# Patient Record
Sex: MALE | Race: WHITE | NOT HISPANIC OR LATINO | URBAN - METROPOLITAN AREA
[De-identification: names, ages, dates, MRNs, and addresses within clinical notes are randomized per-mention and may not be internally consistent; named-entity substitution may affect disease eponyms.]

---

## 2017-02-08 ENCOUNTER — OUTPATIENT (OUTPATIENT)
Dept: OUTPATIENT SERVICES | Facility: HOSPITAL | Age: 77
LOS: 1 days | Discharge: HOME | End: 2017-02-08

## 2017-06-27 DIAGNOSIS — E87.5 HYPERKALEMIA: ICD-10-CM

## 2019-04-02 ENCOUNTER — INPATIENT (INPATIENT)
Facility: HOSPITAL | Age: 79
LOS: 6 days | Discharge: NURSING, REHAB OR LONG-TERM CARE | End: 2019-04-09
Attending: INTERNAL MEDICINE | Admitting: INTERNAL MEDICINE
Payer: COMMERCIAL

## 2019-04-02 VITALS
RESPIRATION RATE: 18 BRPM | SYSTOLIC BLOOD PRESSURE: 78 MMHG | DIASTOLIC BLOOD PRESSURE: 54 MMHG | OXYGEN SATURATION: 96 % | HEIGHT: 69 IN | WEIGHT: 175.05 LBS | HEART RATE: 55 BPM

## 2019-04-02 DIAGNOSIS — I50.9 HEART FAILURE, UNSPECIFIED: ICD-10-CM

## 2019-04-02 DIAGNOSIS — I95.9 HYPOTENSION, UNSPECIFIED: ICD-10-CM

## 2019-04-02 DIAGNOSIS — Z98.890 OTHER SPECIFIED POSTPROCEDURAL STATES: Chronic | ICD-10-CM

## 2019-04-02 DIAGNOSIS — R63.4 ABNORMAL WEIGHT LOSS: ICD-10-CM

## 2019-04-02 DIAGNOSIS — E11.9 TYPE 2 DIABETES MELLITUS WITHOUT COMPLICATIONS: ICD-10-CM

## 2019-04-02 DIAGNOSIS — R47.81 SLURRED SPEECH: ICD-10-CM

## 2019-04-02 DIAGNOSIS — E03.9 HYPOTHYROIDISM, UNSPECIFIED: ICD-10-CM

## 2019-04-02 DIAGNOSIS — N17.9 ACUTE KIDNEY FAILURE, UNSPECIFIED: ICD-10-CM

## 2019-04-02 LAB
ALBUMIN SERPL ELPH-MCNC: 2.9 G/DL — LOW (ref 3.5–5.2)
ALP SERPL-CCNC: 164 U/L — HIGH (ref 30–115)
ALT FLD-CCNC: 21 U/L — SIGNIFICANT CHANGE UP (ref 0–41)
ANION GAP SERPL CALC-SCNC: 14 MMOL/L — SIGNIFICANT CHANGE UP (ref 7–14)
ANION GAP SERPL CALC-SCNC: 15 MMOL/L — HIGH (ref 7–14)
ANION GAP SERPL CALC-SCNC: 19 MMOL/L — HIGH (ref 7–14)
APPEARANCE UR: CLEAR — SIGNIFICANT CHANGE UP
APTT BLD: 25.4 SEC — LOW (ref 27–39.2)
AST SERPL-CCNC: 28 U/L — SIGNIFICANT CHANGE UP (ref 0–41)
BASE EXCESS BLDV CALC-SCNC: -12.2 MMOL/L — LOW (ref -2–2)
BASOPHILS # BLD AUTO: 0.04 K/UL — SIGNIFICANT CHANGE UP (ref 0–0.2)
BASOPHILS NFR BLD AUTO: 0.3 % — SIGNIFICANT CHANGE UP (ref 0–1)
BILIRUB SERPL-MCNC: 0.4 MG/DL — SIGNIFICANT CHANGE UP (ref 0.2–1.2)
BILIRUB UR-MCNC: NEGATIVE — SIGNIFICANT CHANGE UP
BUN SERPL-MCNC: 134 MG/DL — CRITICAL HIGH (ref 10–20)
BUN SERPL-MCNC: 137 MG/DL — CRITICAL HIGH (ref 10–20)
BUN SERPL-MCNC: 153 MG/DL — CRITICAL HIGH (ref 10–20)
CA-I SERPL-SCNC: 1.29 MMOL/L — SIGNIFICANT CHANGE UP (ref 1.12–1.3)
CALCIUM SERPL-MCNC: 7.7 MG/DL — LOW (ref 8.5–10.1)
CALCIUM SERPL-MCNC: 7.8 MG/DL — LOW (ref 8.5–10.1)
CALCIUM SERPL-MCNC: 9.1 MG/DL — SIGNIFICANT CHANGE UP (ref 8.5–10.1)
CHLORIDE SERPL-SCNC: 116 MMOL/L — HIGH (ref 98–110)
CHLORIDE SERPL-SCNC: 120 MMOL/L — HIGH (ref 98–110)
CHLORIDE SERPL-SCNC: 121 MMOL/L — HIGH (ref 98–110)
CO2 SERPL-SCNC: 13 MMOL/L — LOW (ref 17–32)
CO2 SERPL-SCNC: 13 MMOL/L — LOW (ref 17–32)
CO2 SERPL-SCNC: 18 MMOL/L — SIGNIFICANT CHANGE UP (ref 17–32)
COLOR SPEC: YELLOW — SIGNIFICANT CHANGE UP
CREAT SERPL-MCNC: 4.2 MG/DL — CRITICAL HIGH (ref 0.7–1.5)
CREAT SERPL-MCNC: 4.5 MG/DL — CRITICAL HIGH (ref 0.7–1.5)
CREAT SERPL-MCNC: 5.1 MG/DL — CRITICAL HIGH (ref 0.7–1.5)
DIFF PNL FLD: ABNORMAL
EOSINOPHIL # BLD AUTO: 0.12 K/UL — SIGNIFICANT CHANGE UP (ref 0–0.7)
EOSINOPHIL NFR BLD AUTO: 0.9 % — SIGNIFICANT CHANGE UP (ref 0–8)
GAS PNL BLDV: 148 MMOL/L — HIGH (ref 136–145)
GAS PNL BLDV: SIGNIFICANT CHANGE UP
GLUCOSE BLDC GLUCOMTR-MCNC: 132 MG/DL — HIGH (ref 70–99)
GLUCOSE BLDC GLUCOMTR-MCNC: 177 MG/DL — HIGH (ref 70–99)
GLUCOSE SERPL-MCNC: 133 MG/DL — HIGH (ref 70–99)
GLUCOSE SERPL-MCNC: 139 MG/DL — HIGH (ref 70–99)
GLUCOSE SERPL-MCNC: 159 MG/DL — HIGH (ref 70–99)
GLUCOSE UR QL: NEGATIVE MG/DL — SIGNIFICANT CHANGE UP
HCO3 BLDV-SCNC: 13 MMOL/L — LOW (ref 22–29)
HCT VFR BLD CALC: 45.3 % — SIGNIFICANT CHANGE UP (ref 42–52)
HCT VFR BLDA CALC: 51.2 % — HIGH (ref 34–44)
HGB BLD CALC-MCNC: 16.7 G/DL — SIGNIFICANT CHANGE UP (ref 14–18)
HGB BLD-MCNC: 14.1 G/DL — SIGNIFICANT CHANGE UP (ref 14–18)
HOROWITZ INDEX BLDV+IHG-RTO: 21 — SIGNIFICANT CHANGE UP
IMM GRANULOCYTES NFR BLD AUTO: 0.5 % — HIGH (ref 0.1–0.3)
INR BLD: 1.26 RATIO — SIGNIFICANT CHANGE UP (ref 0.65–1.3)
KETONES UR-MCNC: NEGATIVE — SIGNIFICANT CHANGE UP
LACTATE BLDV-MCNC: 2.9 MMOL/L — HIGH (ref 0.5–1.6)
LEUKOCYTE ESTERASE UR-ACNC: ABNORMAL
LYMPHOCYTES # BLD AUTO: 0.51 K/UL — LOW (ref 1.2–3.4)
LYMPHOCYTES # BLD AUTO: 3.8 % — LOW (ref 20.5–51.1)
MCHC RBC-ENTMCNC: 29.7 PG — SIGNIFICANT CHANGE UP (ref 27–31)
MCHC RBC-ENTMCNC: 31.1 G/DL — LOW (ref 32–37)
MCV RBC AUTO: 95.6 FL — HIGH (ref 80–94)
MONOCYTES # BLD AUTO: 1.24 K/UL — HIGH (ref 0.1–0.6)
MONOCYTES NFR BLD AUTO: 9.4 % — HIGH (ref 1.7–9.3)
NEUTROPHILS # BLD AUTO: 11.28 K/UL — HIGH (ref 1.4–6.5)
NEUTROPHILS NFR BLD AUTO: 85.1 % — HIGH (ref 42.2–75.2)
NITRITE UR-MCNC: NEGATIVE — SIGNIFICANT CHANGE UP
NRBC # BLD: 0 /100 WBCS — SIGNIFICANT CHANGE UP (ref 0–0)
NT-PROBNP SERPL-SCNC: 5261 PG/ML — HIGH (ref 0–300)
PCO2 BLDV: 28 MMHG — LOW (ref 41–51)
PH BLDV: 7.27 — SIGNIFICANT CHANGE UP (ref 7.26–7.43)
PH UR: 5.5 — SIGNIFICANT CHANGE UP (ref 5–8)
PLATELET # BLD AUTO: 114 K/UL — LOW (ref 130–400)
PO2 BLDV: 40 MMHG — SIGNIFICANT CHANGE UP (ref 20–40)
POTASSIUM BLDV-SCNC: 5.5 MMOL/L — SIGNIFICANT CHANGE UP (ref 3.3–5.6)
POTASSIUM SERPL-MCNC: 4.7 MMOL/L — SIGNIFICANT CHANGE UP (ref 3.5–5)
POTASSIUM SERPL-MCNC: 5 MMOL/L — SIGNIFICANT CHANGE UP (ref 3.5–5)
POTASSIUM SERPL-MCNC: 6.9 MMOL/L — CRITICAL HIGH (ref 3.5–5)
POTASSIUM SERPL-SCNC: 4.7 MMOL/L — SIGNIFICANT CHANGE UP (ref 3.5–5)
POTASSIUM SERPL-SCNC: 5 MMOL/L — SIGNIFICANT CHANGE UP (ref 3.5–5)
POTASSIUM SERPL-SCNC: 6.9 MMOL/L — CRITICAL HIGH (ref 3.5–5)
PROT SERPL-MCNC: 6.2 G/DL — SIGNIFICANT CHANGE UP (ref 6–8)
PROT UR-MCNC: ABNORMAL MG/DL
PROTHROM AB SERPL-ACNC: 14.5 SEC — HIGH (ref 9.95–12.87)
RBC # BLD: 4.74 M/UL — SIGNIFICANT CHANGE UP (ref 4.7–6.1)
RBC # FLD: 16.4 % — HIGH (ref 11.5–14.5)
RBC CASTS # UR COMP ASSIST: ABNORMAL /HPF
SAO2 % BLDV: 68 % — SIGNIFICANT CHANGE UP
SODIUM SERPL-SCNC: 148 MMOL/L — HIGH (ref 135–146)
SODIUM SERPL-SCNC: 149 MMOL/L — HIGH (ref 135–146)
SODIUM SERPL-SCNC: 152 MMOL/L — HIGH (ref 135–146)
SP GR SPEC: 1.01 — SIGNIFICANT CHANGE UP (ref 1.01–1.03)
TROPONIN T SERPL-MCNC: 0.03 NG/ML — CRITICAL HIGH
TROPONIN T SERPL-MCNC: 0.05 NG/ML — CRITICAL HIGH
UROBILINOGEN FLD QL: 0.2 MG/DL — SIGNIFICANT CHANGE UP (ref 0.2–0.2)
WBC # BLD: 13.26 K/UL — HIGH (ref 4.8–10.8)
WBC # FLD AUTO: 13.26 K/UL — HIGH (ref 4.8–10.8)
WBC UR QL: ABNORMAL /HPF

## 2019-04-02 PROCEDURE — 71045 X-RAY EXAM CHEST 1 VIEW: CPT | Mod: 26,76

## 2019-04-02 PROCEDURE — 76937 US GUIDE VASCULAR ACCESS: CPT | Mod: 26

## 2019-04-02 PROCEDURE — 71275 CT ANGIOGRAPHY CHEST: CPT | Mod: 26

## 2019-04-02 PROCEDURE — 99291 CRITICAL CARE FIRST HOUR: CPT | Mod: 25

## 2019-04-02 PROCEDURE — 36556 INSERT NON-TUNNEL CV CATH: CPT

## 2019-04-02 RX ORDER — DEXTROSE 50 % IN WATER 50 %
100 SYRINGE (ML) INTRAVENOUS ONCE
Qty: 0 | Refills: 0 | Status: COMPLETED | OUTPATIENT
Start: 2019-04-02 | End: 2019-04-02

## 2019-04-02 RX ORDER — INSULIN HUMAN 100 [IU]/ML
10 INJECTION, SOLUTION SUBCUTANEOUS ONCE
Qty: 0 | Refills: 0 | Status: COMPLETED | OUTPATIENT
Start: 2019-04-02 | End: 2019-04-02

## 2019-04-02 RX ORDER — HEPARIN SODIUM 5000 [USP'U]/ML
5000 INJECTION INTRAVENOUS; SUBCUTANEOUS EVERY 8 HOURS
Qty: 0 | Refills: 0 | Status: DISCONTINUED | OUTPATIENT
Start: 2019-04-02 | End: 2019-04-07

## 2019-04-02 RX ORDER — SODIUM CHLORIDE 9 MG/ML
1000 INJECTION, SOLUTION INTRAVENOUS ONCE
Qty: 0 | Refills: 0 | Status: COMPLETED | OUTPATIENT
Start: 2019-04-02 | End: 2019-04-02

## 2019-04-02 RX ORDER — SODIUM CHLORIDE 9 MG/ML
10 INJECTION INTRAMUSCULAR; INTRAVENOUS; SUBCUTANEOUS EVERY 8 HOURS
Qty: 0 | Refills: 0 | Status: DISCONTINUED | OUTPATIENT
Start: 2019-04-02 | End: 2019-04-07

## 2019-04-02 RX ORDER — ATORVASTATIN CALCIUM 80 MG/1
20 TABLET, FILM COATED ORAL AT BEDTIME
Qty: 0 | Refills: 0 | Status: DISCONTINUED | OUTPATIENT
Start: 2019-04-02 | End: 2019-04-07

## 2019-04-02 RX ORDER — LEVOTHYROXINE SODIUM 125 MCG
50 TABLET ORAL DAILY
Qty: 0 | Refills: 0 | Status: DISCONTINUED | OUTPATIENT
Start: 2019-04-02 | End: 2019-04-03

## 2019-04-02 RX ORDER — SODIUM CHLORIDE 9 MG/ML
1000 INJECTION INTRAMUSCULAR; INTRAVENOUS; SUBCUTANEOUS ONCE
Qty: 0 | Refills: 0 | Status: COMPLETED | OUTPATIENT
Start: 2019-04-02 | End: 2019-04-02

## 2019-04-02 RX ORDER — SODIUM CHLORIDE 9 MG/ML
1000 INJECTION, SOLUTION INTRAVENOUS
Qty: 0 | Refills: 0 | Status: DISCONTINUED | OUTPATIENT
Start: 2019-04-02 | End: 2019-04-03

## 2019-04-02 RX ORDER — ASPIRIN/CALCIUM CARB/MAGNESIUM 324 MG
81 TABLET ORAL DAILY
Qty: 0 | Refills: 0 | Status: DISCONTINUED | OUTPATIENT
Start: 2019-04-02 | End: 2019-04-07

## 2019-04-02 RX ORDER — TAMSULOSIN HYDROCHLORIDE 0.4 MG/1
0.4 CAPSULE ORAL AT BEDTIME
Qty: 0 | Refills: 0 | Status: DISCONTINUED | OUTPATIENT
Start: 2019-04-02 | End: 2019-04-07

## 2019-04-02 RX ORDER — SODIUM CHLORIDE 9 MG/ML
500 INJECTION, SOLUTION INTRAVENOUS ONCE
Qty: 0 | Refills: 0 | Status: DISCONTINUED | OUTPATIENT
Start: 2019-04-02 | End: 2019-04-02

## 2019-04-02 RX ORDER — MEROPENEM 1 G/30ML
500 INJECTION INTRAVENOUS ONCE
Qty: 0 | Refills: 0 | Status: COMPLETED | OUTPATIENT
Start: 2019-04-02 | End: 2019-04-02

## 2019-04-02 RX ORDER — CALCIUM GLUCONATE 100 MG/ML
1 VIAL (ML) INTRAVENOUS ONCE
Qty: 0 | Refills: 0 | Status: COMPLETED | OUTPATIENT
Start: 2019-04-02 | End: 2019-04-02

## 2019-04-02 RX ORDER — MEROPENEM 1 G/30ML
500 INJECTION INTRAVENOUS EVERY 24 HOURS
Qty: 0 | Refills: 0 | Status: DISCONTINUED | OUTPATIENT
Start: 2019-04-02 | End: 2019-04-07

## 2019-04-02 RX ORDER — NOREPINEPHRINE BITARTRATE/D5W 8 MG/250ML
0.05 PLASTIC BAG, INJECTION (ML) INTRAVENOUS
Qty: 8 | Refills: 0 | Status: DISCONTINUED | OUTPATIENT
Start: 2019-04-02 | End: 2019-04-03

## 2019-04-02 RX ADMIN — SODIUM CHLORIDE 1000 MILLILITER(S): 9 INJECTION, SOLUTION INTRAVENOUS at 12:40

## 2019-04-02 RX ADMIN — HEPARIN SODIUM 5000 UNIT(S): 5000 INJECTION INTRAVENOUS; SUBCUTANEOUS at 21:38

## 2019-04-02 RX ADMIN — SODIUM CHLORIDE 100 MILLILITER(S): 9 INJECTION, SOLUTION INTRAVENOUS at 17:55

## 2019-04-02 RX ADMIN — Medication 1200 GRAM(S): at 14:22

## 2019-04-02 RX ADMIN — Medication 6.8 MICROGRAM(S)/KG/MIN: at 18:39

## 2019-04-02 RX ADMIN — INSULIN HUMAN 10 UNIT(S): 100 INJECTION, SOLUTION SUBCUTANEOUS at 14:18

## 2019-04-02 RX ADMIN — SODIUM CHLORIDE 10 MILLILITER(S): 9 INJECTION INTRAMUSCULAR; INTRAVENOUS; SUBCUTANEOUS at 21:31

## 2019-04-02 RX ADMIN — SODIUM CHLORIDE 2000 MILLILITER(S): 9 INJECTION INTRAMUSCULAR; INTRAVENOUS; SUBCUTANEOUS at 17:54

## 2019-04-02 RX ADMIN — SODIUM CHLORIDE 4000 MILLILITER(S): 9 INJECTION INTRAMUSCULAR; INTRAVENOUS; SUBCUTANEOUS at 13:50

## 2019-04-02 RX ADMIN — SODIUM CHLORIDE 1000 MILLILITER(S): 9 INJECTION INTRAMUSCULAR; INTRAVENOUS; SUBCUTANEOUS at 14:30

## 2019-04-02 RX ADMIN — Medication 100 MILLILITER(S): at 14:12

## 2019-04-02 RX ADMIN — MEROPENEM 100 MILLIGRAM(S): 1 INJECTION INTRAVENOUS at 15:58

## 2019-04-02 NOTE — ED ADULT NURSE NOTE - PMH
BPH (benign prostatic hyperplasia)    CHF (congestive heart failure)    Diabetes mellitus, type 2    Gout    Hard of hearing    High blood cholesterol    Hyperkalemia    Hypothyroid    PVD (peripheral vascular disease)

## 2019-04-02 NOTE — H&P ADULT - NSHPLABSRESULTS_GEN_ALL_CORE
LABS  04-02    148<H>  |  116<H>  |  153<HH>  ----------------------------<  139<H>  6.9<HH>   |  13<L>  |  5.1<HH>    Ca    9.1      02 Apr 2019 13:05    TPro  6.2  /  Alb  2.9<L>  /  TBili  0.4  /  DBili  x   /  AST  28  /  ALT  21  /  AlkPhos  164<H>  04-02                          14.1   13.26 )-----------( 114      ( 02 Apr 2019 13:05 )             45.3       PT/INR - ( 02 Apr 2019 13:05 )   PT: 14.50 sec;   INR: 1.26 ratio         PTT - ( 02 Apr 2019 13:05 )  PTT:25.4 sec    CARDIAC ENZYMES    Troponin T, Serum (04.02.19 @ 13:05)    Troponin T, Serum: 0.05: Hemolyzed. Interpret with caution    < from: CT Angio Chest Dissection Protocol (04.02.19 @ 14:30) >    IMPRESSION:    1.  No aortic dissection or hematoma. Aneurysmal dilatation of the   ascending and descending thoracic aorta to 4.8 and 4.5 cm, respectively,   post ascending aortic repair..    2.  Numerous sclerotic lesions scattered throughout the spine, ribs and   partially imaged pelvis are consistent with metastatic disease.    3.  Mildly dilated air and fluid-filled esophagus with wall thickening,   correlate for esophagitis.    4.  Bibasilar lung atelectasis versus consolidation.     5.  Large bilateral renal cysts.    < end of copied text >    Urinalysis (04.02.19 @ 14:24)    pH Urine: 5.5    Glucose Qualitative, Urine: Negative mg/dL    Blood, Urine: Trace-lysed    Color: Yellow    Urine Appearance: Clear    Bilirubin: Negative    Ketone - Urine: Negative    Specific Gravity: 1.015    Protein, Urine: Trace mg/dL    Urobilinogen: 0.2 mg/dL    Nitrite: Negative    Leukocyte Esterase Concentration: Moderate

## 2019-04-02 NOTE — ED PROVIDER NOTE - CARE PLAN
Principal Discharge DX:	Hypotension, unspecified hypotension type  Secondary Diagnosis:	Acute renal failure, unspecified acute renal failure type

## 2019-04-02 NOTE — H&P ADULT - HISTORY OF PRESENT ILLNESS
79 year male who lives at [facility] presented to the hospital today after the family noticed a significant change in his mental status. Over the past 2 weeks he didn't answer his phone, he seemed confused and was not himself (he  normally loves to talk). Over the past few days he had slurring of his speech. In addition he has had a dramatic weight loss over the past 3 months. In ED the pt was found hypotensive, in Acute kidney Injury and with acute urinary retention. Anderson placed and over 1 liter cloudy urine came out.

## 2019-04-02 NOTE — H&P ADULT - NSICDXPASTMEDICALHX_GEN_ALL_CORE_FT
PAST MEDICAL HISTORY:  BPH (benign prostatic hyperplasia)     CHF (congestive heart failure)     Diabetes mellitus, type 2     Gout     Hard of hearing     High blood cholesterol     Hyperkalemia     Hypothyroid     PVD (peripheral vascular disease)

## 2019-04-02 NOTE — H&P ADULT - PROBLEM SELECTOR PLAN 2
responded to IVF  If bp remains low start Levophed  Meropenem, check pan cultures  possible aspiration pneumonia

## 2019-04-02 NOTE — ED PROVIDER NOTE - OBJECTIVE STATEMENT
79yM with PMH thoracic aortic aneurysm and bicuspid aortic valve s/p repair, HTN, HLD, CHF, DM, thyroid disease p/w hypotension and tachycardia with new onset afib from nursing home. pt aaoX2 with baseline aaoX4, intermittently not coherent. Per brother, he has been acting off the past couple days, yesterday clutching his chest and head and "acting different". unable to obtain ros 2/2 ams.

## 2019-04-02 NOTE — ED PROVIDER NOTE - PROGRESS NOTE DETAILS
high concern for dissection, will send to CT without labs, two physician consent obtained (Iván Urbano) pt BP improved after 1L NS (109/70), no deterioration in resp status, sanchez with 1300ml u/o, in CT now pt returned from CT, no dissection on my review, SBP 80, still resting comfortably, labs with ARF, had been in retention, will treat hyperK and call renal d/w Dr Castillo renal, will continue current management (sanchez/hydration) family now at bedside, report h/o LEE in past requiring HD, ?etiology, also with significant recent weight loss and confusion/dysphonia, full code, CT read appreciated, accepted to ICU by Dr Ceballos, endorsed to Dr Coleman

## 2019-04-02 NOTE — H&P ADULT - NSHPREVIEWOFSYSTEMS_GEN_ALL_CORE
CONSTITUTIONAL: No weakness, fevers or chills  EYES/ENT: No visual changes;  No vertigo or throat pain   NECK: No pain or stiffness  RESPIRATORY: No cough, wheezing, hemoptysis; No shortness of breath  CARDIOVASCULAR: No chest pain or palpitations  GASTROINTESTINAL: No abdominal or epigastric pain. No nausea, vomiting, or hematemesis; No diarrhea or constipation. No melena or hematochezia.  GENITOURINARY: retention  NEUROLOGICAL: confusion  SKIN: No itching, rashes

## 2019-04-02 NOTE — ED PROCEDURE NOTE - CPROC ED INFUS LINE DETAIL1
The catheter was placed using sterile technique./The guidewire was recovered./Ultrasound guidance was used during placement./The location was identified, and the area was draped and prepped./All lumen(s) aspirated and flushed without difficulty.

## 2019-04-02 NOTE — H&P ADULT - NSHPPHYSICALEXAM_GEN_ALL_CORE
PHYSICAL EXAM:  Vital Signs Last 24 Hrs  T(C): 35.7 (02 Apr 2019 13:09), Max: 35.7 (02 Apr 2019 13:09)  T(F): 96.2 (02 Apr 2019 13:09), Max: 96.2 (02 Apr 2019 13:09)  HR: 99 (02 Apr 2019 15:24) (55 - 115)  BP: 90/52 (02 Apr 2019 15:24) (78/54 - 121/59)  RR: 18 (02 Apr 2019 15:24) (17 - 18)  SpO2: 99% (02 Apr 2019 15:24) (94% - 100%)    GENERAL: NAD  HEAD:  Atraumatic, Normocephalic  NERVOUS SYSTEM:  no focal deficits  CHEST/LUNG: Clear to percussion bilaterally; No rales, rhonchi, wheezing, or rubs  HEART: Regular rate and rhythm; No murmurs, rubs, or gallops  ABDOMEN: Soft, Nontender, Nondistended; Bowel sounds present  EXTREMITIES:  chronic venous stasis ulcers

## 2019-04-02 NOTE — CONSULT NOTE ADULT - ASSESSMENT
Impression:  Sepsis/ septic shock  Urosepsis/ PNA  LEE/ hyperkalemia  Metastatic disease. ? etiology     IMPRESSION:      PLAN:     CNS: NO sedation. CT head.     HEENT: Oral care    PULMONARY:  HOB @ 45 degrees. Meropenem for now. Sputum culture. Aspiration precaution.     CARDIOVASCULAR: 2 liter LR bolus then on IV fluid with Bicarb drip at 100cc/hr. ECHO. Trend troponin.     GI: GI prophylaxis.  Feeding as tolerated. Speech and swallow eval    RENAL:  Follow up lytes.  Correct as needed. Continue with IV hydration. Avoid nephrotoxic. Adjust antibiotics as per CrCl. No hydro on CT abdomen Correct hyperkalemia. Repeat BMP in 4 hours.     INFECTIOUS DISEASE: Follow up cultures. Continue with meropenem for now. Blood culture.     HEMATOLOGICAL:  DVT prophylaxis. Out patient heme onc work up for malignancy     ENDOCRINE:  Follow up FS.  Insulin protocol if needed.    MUSCULOSKELETAL:    ICU for now. Impression:  Sepsis/ septic shock  Urosepsis/ PNA  LEE/ hyperkalemia  Metastatic disease. ? etiology     IMPRESSION:      PLAN:     CNS: NO sedation. CT head.     HEENT: Oral care    PULMONARY:  HOB @ 45 degrees. Meropenem for now. Sputum culture. Aspiration precaution.     CARDIOVASCULAR: 2 liter LR bolus then on IV fluid with Bicarb drip at 100cc/hr. ECHO. Trend troponin.     GI: GI prophylaxis.  Feeding as tolerated. Speech and swallow eval    RENAL:  Follow up lytes.  Correct as needed. Continue with IV hydration. Avoid nephrotoxic. Adjust antibiotics as per CrCl. No hydro on CT abdomen Correct hyperkalemia. Repeat BMP in 4 hours.     INFECTIOUS DISEASE: Follow up cultures. Continue with meropenem for now. Blood culture.     HEMATOLOGICAL:  DVT prophylaxis. Out patient heme onc work up for malignancy     ENDOCRINE:  Follow up FS.  Insulin protocol if needed.    MUSCULOSKELETAL:    ICU. Impression:  Sepsis/ septic shock  Urosepsis/ PNA  LEE/ hyperkalemia  Metastatic disease. ? etiology     IMPRESSION:      PLAN:     CNS: NO sedation. CT head.     HEENT: Oral care    PULMONARY:  HOB @ 45 degrees. Meropenem for now. Sputum culture. Aspiration precaution.     CARDIOVASCULAR: 2 liter LR bolus then on IV fluid with Bicarb drip at 100cc/hr. ECHO. Trend troponin.     GI: GI prophylaxis.  Feeding as tolerated. Speech and swallow eval    RENAL:  Follow up lytes.  Correct as needed. Continue with IV hydration. Avoid nephrotoxic. Adjust antibiotics as per CrCl. No hydro on CT abdomen Correct hyperkalemia. Repeat BMP in 4 hours.  Admit to water room may need RRT.    INFECTIOUS DISEASE: Follow up cultures. Continue with meropenem for now. Blood culture.     HEMATOLOGICAL:  DVT prophylaxis. Out patient heme onc work up for malignancy     ENDOCRINE:  Follow up FS.  Insulin protocol if needed.    MUSCULOSKELETAL:    ICU.     Prognosis grave.

## 2019-04-02 NOTE — ED PROVIDER NOTE - CRITICAL CARE PROVIDED
documentation/telephone consultation with the patient's family/consult w/ pt's family directly relating to pts condition/interpretation of diagnostic studies/consultation with other physicians/conducted a detailed discussion of DNR status/additional history taking/direct patient care (not related to procedure)

## 2019-04-02 NOTE — ED PROVIDER NOTE - PHYSICAL EXAMINATION
Vital Signs: I have reviewed the initial vital signs.  Constitutional: NAD, frail appearing, appears stated age, no acute distress.  HEENT: Airway patent, dry MM, no erythema/swelling/deformity of oral structures. EOMI, PERRLA.  CV: tachycardic rate, irregular rhythm, well-perfused extremities, 2+ b/l DP and radial pulses equal.  Lungs: BL lower lung field crackles, no increased WOB.  ABD: NTND, no guarding or rebound, no pulsatile mass, no hernias.   MSK: Neck supple, nontender, nl ROM, no stepoff. Chest nontender. Back nontender in TLS spine or to b/l bony structures or flanks. Ext nontender, nl rom, no deformity.   INTEG: Skin warm, dry, no rash.  NEURO: A&Ox2, moving all extremities, weak voice, quiet speech  PSYCH: cooperative, normal affect and interaction.

## 2019-04-02 NOTE — ED PROVIDER NOTE - CLINICAL SUMMARY MEDICAL DECISION MAKING FREE TEXT BOX
79y male above PMH sent apparently for SOB, minimal info available on arrival, per PMD [place] h/o normal kidneys and thoracic aneurysm, treated for UTI, patient himself has no complaints, on exam hypotensive, afebrile rectally, sat 96% on 2L NC, HR 110s AF (unknown if new or old), head nc/at, perrla, conj pink dry mm neck supple cor irreg lungs with dry crackles at bases otherwise clear abd +bs, snt no c/c/e neuro nonfocal pulses equal, borwn stool in vault no blood, given h/o aneurysm pt emergently scanned for dissection, family arrived and gave h/o significant weight loss with weakness/confusion for last 2 weeks and difficulty speaking, labs and studies reviewed, nephrology and ICU consulted, will cover with abx and admti ICU

## 2019-04-02 NOTE — H&P ADULT - PROBLEM SELECTOR PLAN 1
with severe metabolic acidosis and hyperkalemia  possibly secondary to BPH with superimposed sepsis from uti  IV fluids and bicarb, DC Metformin  nephrology consult for possible RRT  check serial bmp's  ICU monitoring  DVT prophylaxis

## 2019-04-02 NOTE — CONSULT NOTE ADULT - SUBJECTIVE AND OBJECTIVE BOX
Patient is a 79y old  Male who presents with a chief complaint of     HPI: Patient is a poor historian History obtained from family.  Here for non specific  complaints.  SOB + quang loss      PAST MEDICAL & SURGICAL HISTORY:      SOCIAL HX:   Smoking                         ETOH                            Other    FAMILY HISTORY:  :  No known cardiovacular family hisotry     ROS:  See HPI     Allergies    No Known Allergies    Intolerances          PHYSICAL EXAM    ICU Vital Signs Last 24 Hrs  T(C): 35.7 (2019 13:09), Max: 35.7 (2019 13:09)  T(F): 96.2 (2019 13:09), Max: 96.2 (2019 13:09)  HR: 90 (2019 14:54) (55 - 115)  BP: 102/58 (2019 14:54) (78/54 - 121/59)  BP(mean): --  ABP: --  ABP(mean): --  RR: 17 (2019 14:54) (17 - 18)  SpO2: 99% (2019 14:54) (94% - 100%)      General: In NAD   HEENT:  BLANQUITA              Lymphatic system: No cervical LN   Lungs: Bilateral BS  Cardiovascular: Regular  Gastrointestinal: Soft, Positive BS  Musculoskeletal: No clubbing.  Moves all extremities.  Full range of motion   Skin: Warm.  Intact  Neurological: No motor or sensory deficit       19 @ 07:01  -  19 @ 15:08  --------------------------------------------------------  IN:  Total IN: 0 mL    OUT:    Indwelling Catheter - Urethral: 1300 mL  Total OUT: 1300 mL    Total NET: -1300 mL          LABS:                          14.1   13.26 )-----------( 114      ( 2019 13:05 )             45.3                                               04-02    148<H>  |  116<H>  |  153<HH>  ----------------------------<  139<H>  6.9<HH>   |  13<L>  |  5.1<HH>    Ca    9.1      2019 13:05    TPro  6.2  /  Alb  2.9<L>  /  TBili  0.4  /  DBili  x   /  AST  28  /  ALT  21  /  AlkPhos  164<H>  04-02      PT/INR - ( 2019 13:05 )   PT: 14.50 sec;   INR: 1.26 ratio         PTT - ( 2019 13:05 )  PTT:25.4 sec                                       Urinalysis Basic - ( 2019 14:24 )    Color: Yellow / Appearance: Clear / S.015 / pH: x  Gluc: x / Ketone: Negative  / Bili: Negative / Urobili: 0.2 mg/dL   Blood: x / Protein: Trace mg/dL / Nitrite: Negative   Leuk Esterase: Moderate / RBC: 6-10 /HPF / WBC 26-50 /HPF   Sq Epi: x / Non Sq Epi: x / Bacteria: x        CARDIAC MARKERS ( 2019 13:05 )  x     / 0.05 ng/mL / x     / x     / x                                                LIVER FUNCTIONS - ( 2019 13:05 )  Alb: 2.9 g/dL / Pro: 6.2 g/dL / ALK PHOS: 164 U/L / ALT: 21 U/L / AST: 28 U/L / GGT: x                                             X-Rays                                                                                     ECHO    MEDICATIONS  (STANDING):  meropenem  IVPB 500 milliGRAM(s) IV Intermittent once    MEDICATIONS  (PRN): Patient is a 79y old  Male who presents with a chief complaint of     HPI: Patient is a poor historian History obtained from family.  Patinet progressively getting confused, slurred speech, weight loss. HO AAA repair in past. IN ER found to be hypotensive. Rssponded to fluid bolus. CT abdomen done had shown Diffuse mets.       PAST MEDICAL & SURGICAL HISTORY:      SOCIAL HX:   Smoking                         ETOH                            Other    FAMILY HISTORY:  :  No known cardiovacular family hisotry     ROS:  See HPI     Allergies    No Known Allergies    Intolerances          PHYSICAL EXAM    ICU Vital Signs Last 24 Hrs  T(C): 35.7 (2019 13:09), Max: 35.7 (2019 13:09)  T(F): 96.2 (2019 13:09), Max: 96.2 (2019 13:09)  HR: 90 (2019 14:54) (55 - 115)  BP: 102/58 (2019 14:54) (78/54 - 121/59)  BP(mean): --  ABP: --  ABP(mean): --  RR: 17 (2019 14:54) (17 - 18)  SpO2: 99% (2019 14:54) (94% - 100%)      General: Awkae. follows simlpe commnads.   HEENT:  BLANQUITA              Lymphatic system: No cervical LN   Lungs: Bilateral BS  Cardiovascular: Regular  Gastrointestinal: Soft, Positive BS  Musculoskeletal: No clubbing.  Moves all extremities.  Full range of motion   Skin: Warm.  Intact  Neurological: No motor or sensory deficit       19 @ 07:01  -  19 @ 15:08  --------------------------------------------------------  IN:  Total IN: 0 mL    OUT:    Indwelling Catheter - Urethral: 1300 mL  Total OUT: 1300 mL    Total NET: -1300 mL          LABS:                          14.1   13.26 )-----------( 114      ( 2019 13:05 )             45.3                                               04-02    148<H>  |  116<H>  |  153<HH>  ----------------------------<  139<H>  6.9<HH>   |  13<L>  |  5.1<HH>    Ca    9.1      2019 13:05    TPro  6.2  /  Alb  2.9<L>  /  TBili  0.4  /  DBili  x   /  AST  28  /  ALT  21  /  AlkPhos  164<H>  04-02      PT/INR - ( 2019 13:05 )   PT: 14.50 sec;   INR: 1.26 ratio         PTT - ( 2019 13:05 )  PTT:25.4 sec                                       Urinalysis Basic - ( 2019 14:24 )    Color: Yellow / Appearance: Clear / S.015 / pH: x  Gluc: x / Ketone: Negative  / Bili: Negative / Urobili: 0.2 mg/dL   Blood: x / Protein: Trace mg/dL / Nitrite: Negative   Leuk Esterase: Moderate / RBC: 6-10 /HPF / WBC 26-50 /HPF   Sq Epi: x / Non Sq Epi: x / Bacteria: x        CARDIAC MARKERS ( 2019 13:05 )  x     / 0.05 ng/mL / x     / x     / x                                                LIVER FUNCTIONS - ( 2019 13:05 )  Alb: 2.9 g/dL / Pro: 6.2 g/dL / ALK PHOS: 164 U/L / ALT: 21 U/L / AST: 28 U/L / GGT: x                                             X-Rays                                                                                     ECHO    MEDICATIONS  (STANDING):  meropenem  IVPB 500 milliGRAM(s) IV Intermittent once    MEDICATIONS  (PRN): Patient is a 79y old  Male who presents with a chief complaint of     HPI: Patient is a poor historian History obtained from family.  Patinet progressively getting confused, slurred speech, weight loss. HO AAA repair in past. IN ER found to be hypotensive. Responded to fluid bolus. CT abdomen done had shown Diffuse mets.       PAST MEDICAL & SURGICAL HISTORY:      SOCIAL HX:   Smoking                         ETOH                            Other    FAMILY HISTORY:  :  No known cardiovacular family hisotry     ROS:  See HPI     Allergies    No Known Allergies    Intolerances          PHYSICAL EXAM    ICU Vital Signs Last 24 Hrs  T(C): 35.7 (2019 13:09), Max: 35.7 (2019 13:09)  T(F): 96.2 (2019 13:09), Max: 96.2 (2019 13:09)  HR: 90 (2019 14:54) (55 - 115)  BP: 102/58 (2019 14:54) (78/54 - 121/59)  BP(mean): --  ABP: --  ABP(mean): --  RR: 17 (2019 14:54) (17 - 18)  SpO2: 99% (2019 14:54) (94% - 100%)      General: Awake. follows simlpe commnads.   HEENT:  BLANQUITA              Lymphatic system: No cervical LN   Lungs: Bilateral BS  Cardiovascular: Regular  Gastrointestinal: Soft, Positive BS  Musculoskeletal: No clubbing.  Moves all extremities.  Full range of motion   Skin: Warm.  Intact  Neurological: No motor or sensory deficit       19 @ 07:01  -  19 @ 15:08  --------------------------------------------------------  IN:  Total IN: 0 mL    OUT:    Indwelling Catheter - Urethral: 1300 mL  Total OUT: 1300 mL    Total NET: -1300 mL          LABS:                          14.1   13.26 )-----------( 114      ( 2019 13:05 )             45.3                                               04-02    148<H>  |  116<H>  |  153<HH>  ----------------------------<  139<H>  6.9<HH>   |  13<L>  |  5.1<HH>    Ca    9.1      2019 13:05    TPro  6.2  /  Alb  2.9<L>  /  TBili  0.4  /  DBili  x   /  AST  28  /  ALT  21  /  AlkPhos  164<H>  04-      PT/INR - ( 2019 13:05 )   PT: 14.50 sec;   INR: 1.26 ratio         PTT - ( 2019 13:05 )  PTT:25.4 sec                                       Urinalysis Basic - ( 2019 14:24 )    Color: Yellow / Appearance: Clear / S.015 / pH: x  Gluc: x / Ketone: Negative  / Bili: Negative / Urobili: 0.2 mg/dL   Blood: x / Protein: Trace mg/dL / Nitrite: Negative   Leuk Esterase: Moderate / RBC: 6-10 /HPF / WBC 26-50 /HPF   Sq Epi: x / Non Sq Epi: x / Bacteria: x        CARDIAC MARKERS ( 2019 13:05 )  x     / 0.05 ng/mL / x     / x     / x                                                LIVER FUNCTIONS - ( 2019 13:05 )  Alb: 2.9 g/dL / Pro: 6.2 g/dL / ALK PHOS: 164 U/L / ALT: 21 U/L / AST: 28 U/L / GGT: x                                             X-Rays                                                                                     ECHO    MEDICATIONS  (STANDING):  meropenem  IVPB 500 milliGRAM(s) IV Intermittent once    MEDICATIONS  (PRN): Patient is a 79y old  Male who presents with a chief complaint of     HPI: Patient is a poor historian History obtained from family.  Patinet progressively getting confused, slurred speech, weight loss. HO AAA repair in past. IN ER found to be hypotensive. Responded to fluid bolus. CT abdomen done had shown possible mets to bones v. metabolic bone disease.       PAST MEDICAL & SURGICAL HISTORY:      SOCIAL HX:   Smoking                         ETOH                            Other    FAMILY HISTORY:  :  No known cardiovacular family hisotry     ROS:  See HPI     Allergies    No Known Allergies    Intolerances          PHYSICAL EXAM    ICU Vital Signs Last 24 Hrs  T(C): 35.7 (2019 13:09), Max: 35.7 (2019 13:09)  T(F): 96.2 (2019 13:09), Max: 96.2 (2019 13:09)  HR: 90 (2019 14:54) (55 - 115)  BP: 102/58 (2019 14:54) (78/54 - 121/59)  BP(mean): --  ABP: --  ABP(mean): --  RR: 17 (2019 14:54) (17 - 18)  SpO2: 99% (2019 14:54) (94% - 100%)      General: Awake. follows simple commands   HEENT:  BLANQUITA              Lymphatic system: No cervical LN   Lungs: Bilateral BS  Cardiovascular: Regular  Gastrointestinal: Soft, Positive BS  Musculoskeletal: No clubbing.  Moves all extremities.  Full range of motion   Skin: Warm.  Intact  Neurological: No motor or sensory deficit       19 @ 07:01  -  19 @ 15:08  --------------------------------------------------------  IN:  Total IN: 0 mL    OUT:    Indwelling Catheter - Urethral: 1300 mL  Total OUT: 1300 mL    Total NET: -1300 mL          LABS:                          14.1   13.26 )-----------( 114      ( 2019 13:05 )             45.3                                               04-02    148<H>  |  116<H>  |  153<HH>  ----------------------------<  139<H>  6.9<HH>   |  13<L>  |  5.1<HH>    Ca    9.1      2019 13:05    TPro  6.2  /  Alb  2.9<L>  /  TBili  0.4  /  DBili  x   /  AST  28  /  ALT  21  /  AlkPhos  164<H>  04-      PT/INR - ( 2019 13:05 )   PT: 14.50 sec;   INR: 1.26 ratio         PTT - ( 2019 13:05 )  PTT:25.4 sec                                       Urinalysis Basic - ( 2019 14:24 )    Color: Yellow / Appearance: Clear / S.015 / pH: x  Gluc: x / Ketone: Negative  / Bili: Negative / Urobili: 0.2 mg/dL   Blood: x / Protein: Trace mg/dL / Nitrite: Negative   Leuk Esterase: Moderate / RBC: 6-10 /HPF / WBC 26-50 /HPF   Sq Epi: x / Non Sq Epi: x / Bacteria: x        CARDIAC MARKERS ( 2019 13:05 )  x     / 0.05 ng/mL / x     / x     / x                                                LIVER FUNCTIONS - ( 2019 13:05 )  Alb: 2.9 g/dL / Pro: 6.2 g/dL / ALK PHOS: 164 U/L / ALT: 21 U/L / AST: 28 U/L / GGT: x                                             X-Rays                                                                                     ECHO    MEDICATIONS  (STANDING):  meropenem  IVPB 500 milliGRAM(s) IV Intermittent once    MEDICATIONS  (PRN):

## 2019-04-03 LAB
ALBUMIN SERPL ELPH-MCNC: 2.3 G/DL — LOW (ref 3.5–5.2)
ALBUMIN SERPL ELPH-MCNC: 2.4 G/DL — LOW (ref 3.5–5.2)
ALP SERPL-CCNC: 135 U/L — HIGH (ref 30–115)
ALP SERPL-CCNC: 158 U/L — HIGH (ref 30–115)
ALT FLD-CCNC: 16 U/L — SIGNIFICANT CHANGE UP (ref 0–41)
ALT FLD-CCNC: 18 U/L — SIGNIFICANT CHANGE UP (ref 0–41)
ANION GAP SERPL CALC-SCNC: 13 MMOL/L — SIGNIFICANT CHANGE UP (ref 7–14)
ANION GAP SERPL CALC-SCNC: 13 MMOL/L — SIGNIFICANT CHANGE UP (ref 7–14)
AST SERPL-CCNC: 13 U/L — SIGNIFICANT CHANGE UP (ref 0–41)
AST SERPL-CCNC: 14 U/L — SIGNIFICANT CHANGE UP (ref 0–41)
BASOPHILS # BLD AUTO: 0.03 K/UL — SIGNIFICANT CHANGE UP (ref 0–0.2)
BASOPHILS NFR BLD AUTO: 0.2 % — SIGNIFICANT CHANGE UP (ref 0–1)
BILIRUB SERPL-MCNC: 0.4 MG/DL — SIGNIFICANT CHANGE UP (ref 0.2–1.2)
BILIRUB SERPL-MCNC: 0.4 MG/DL — SIGNIFICANT CHANGE UP (ref 0.2–1.2)
BUN SERPL-MCNC: 107 MG/DL — CRITICAL HIGH (ref 10–20)
BUN SERPL-MCNC: 117 MG/DL — CRITICAL HIGH (ref 10–20)
CALCIUM SERPL-MCNC: 7.8 MG/DL — LOW (ref 8.5–10.1)
CALCIUM SERPL-MCNC: 8 MG/DL — LOW (ref 8.5–10.1)
CHLORIDE SERPL-SCNC: 120 MMOL/L — HIGH (ref 98–110)
CHLORIDE SERPL-SCNC: 122 MMOL/L — HIGH (ref 98–110)
CK MB CFR SERPL CALC: 5 NG/ML — SIGNIFICANT CHANGE UP (ref 0.6–6.3)
CK SERPL-CCNC: 59 U/L — SIGNIFICANT CHANGE UP (ref 0–225)
CO2 SERPL-SCNC: 20 MMOL/L — SIGNIFICANT CHANGE UP (ref 17–32)
CO2 SERPL-SCNC: 21 MMOL/L — SIGNIFICANT CHANGE UP (ref 17–32)
CREAT ?TM UR-MCNC: 32 MG/DL — SIGNIFICANT CHANGE UP
CREAT SERPL-MCNC: 3.4 MG/DL — HIGH (ref 0.7–1.5)
CREAT SERPL-MCNC: 3.5 MG/DL — HIGH (ref 0.7–1.5)
EOSINOPHIL # BLD AUTO: 0.18 K/UL — SIGNIFICANT CHANGE UP (ref 0–0.7)
EOSINOPHIL NFR BLD AUTO: 1.4 % — SIGNIFICANT CHANGE UP (ref 0–8)
GLUCOSE BLDC GLUCOMTR-MCNC: 145 MG/DL — HIGH (ref 70–99)
GLUCOSE BLDC GLUCOMTR-MCNC: 146 MG/DL — HIGH (ref 70–99)
GLUCOSE BLDC GLUCOMTR-MCNC: 158 MG/DL — HIGH (ref 70–99)
GLUCOSE SERPL-MCNC: 146 MG/DL — HIGH (ref 70–99)
GLUCOSE SERPL-MCNC: 181 MG/DL — HIGH (ref 70–99)
HCT VFR BLD CALC: 38.7 % — LOW (ref 42–52)
HGB BLD-MCNC: 12.3 G/DL — LOW (ref 14–18)
IMM GRANULOCYTES NFR BLD AUTO: 0.7 % — HIGH (ref 0.1–0.3)
LYMPHOCYTES # BLD AUTO: 0.6 K/UL — LOW (ref 1.2–3.4)
LYMPHOCYTES # BLD AUTO: 4.6 % — LOW (ref 20.5–51.1)
MCHC RBC-ENTMCNC: 30.1 PG — SIGNIFICANT CHANGE UP (ref 27–31)
MCHC RBC-ENTMCNC: 31.8 G/DL — LOW (ref 32–37)
MCV RBC AUTO: 94.9 FL — HIGH (ref 80–94)
MONOCYTES # BLD AUTO: 1.08 K/UL — HIGH (ref 0.1–0.6)
MONOCYTES NFR BLD AUTO: 8.4 % — SIGNIFICANT CHANGE UP (ref 1.7–9.3)
NEUTROPHILS # BLD AUTO: 10.94 K/UL — HIGH (ref 1.4–6.5)
NEUTROPHILS NFR BLD AUTO: 84.7 % — HIGH (ref 42.2–75.2)
NRBC # BLD: 0 /100 WBCS — SIGNIFICANT CHANGE UP (ref 0–0)
PLATELET # BLD AUTO: 122 K/UL — LOW (ref 130–400)
POTASSIUM SERPL-MCNC: 4.4 MMOL/L — SIGNIFICANT CHANGE UP (ref 3.5–5)
POTASSIUM SERPL-MCNC: 4.5 MMOL/L — SIGNIFICANT CHANGE UP (ref 3.5–5)
POTASSIUM SERPL-SCNC: 4.4 MMOL/L — SIGNIFICANT CHANGE UP (ref 3.5–5)
POTASSIUM SERPL-SCNC: 4.5 MMOL/L — SIGNIFICANT CHANGE UP (ref 3.5–5)
PROT ?TM UR-MCNC: 21 MG/DLG/24H — SIGNIFICANT CHANGE UP
PROT SERPL-MCNC: 4.8 G/DL — LOW (ref 6–8)
PROT SERPL-MCNC: 5.1 G/DL — LOW (ref 6–8)
PROT/CREAT UR-RTO: 0.7 RATIO — HIGH (ref 0–0.2)
RBC # BLD: 4.08 M/UL — LOW (ref 4.7–6.1)
RBC # FLD: 16.5 % — HIGH (ref 11.5–14.5)
SODIUM SERPL-SCNC: 154 MMOL/L — HIGH (ref 135–146)
SODIUM SERPL-SCNC: 155 MMOL/L — HIGH (ref 135–146)
SODIUM UR-SCNC: 23 MMOL/L — SIGNIFICANT CHANGE UP
TROPONIN T SERPL-MCNC: 0.03 NG/ML — CRITICAL HIGH
TROPONIN T SERPL-MCNC: 0.05 NG/ML — CRITICAL HIGH
WBC # BLD: 12.92 K/UL — HIGH (ref 4.8–10.8)
WBC # FLD AUTO: 12.92 K/UL — HIGH (ref 4.8–10.8)

## 2019-04-03 PROCEDURE — 71045 X-RAY EXAM CHEST 1 VIEW: CPT | Mod: 26

## 2019-04-03 RX ORDER — PANTOPRAZOLE SODIUM 20 MG/1
40 TABLET, DELAYED RELEASE ORAL DAILY
Qty: 0 | Refills: 0 | Status: DISCONTINUED | OUTPATIENT
Start: 2019-04-03 | End: 2019-04-07

## 2019-04-03 RX ORDER — MORPHINE SULFATE 50 MG/1
4 CAPSULE, EXTENDED RELEASE ORAL ONCE
Qty: 0 | Refills: 0 | Status: DISCONTINUED | OUTPATIENT
Start: 2019-04-03 | End: 2019-04-03

## 2019-04-03 RX ORDER — HYDROCORTISONE 20 MG
50 TABLET ORAL EVERY 8 HOURS
Qty: 0 | Refills: 0 | Status: DISCONTINUED | OUTPATIENT
Start: 2019-04-03 | End: 2019-04-05

## 2019-04-03 RX ORDER — PANTOPRAZOLE SODIUM 20 MG/1
40 TABLET, DELAYED RELEASE ORAL
Qty: 0 | Refills: 0 | Status: DISCONTINUED | OUTPATIENT
Start: 2019-04-03 | End: 2019-04-03

## 2019-04-03 RX ORDER — MORPHINE SULFATE 50 MG/1
4 CAPSULE, EXTENDED RELEASE ORAL
Qty: 0 | Refills: 0 | Status: DISCONTINUED | OUTPATIENT
Start: 2019-04-03 | End: 2019-04-05

## 2019-04-03 RX ORDER — PHENYLEPHRINE HYDROCHLORIDE 10 MG/ML
0.1 INJECTION INTRAVENOUS
Qty: 160 | Refills: 0 | Status: DISCONTINUED | OUTPATIENT
Start: 2019-04-03 | End: 2019-04-03

## 2019-04-03 RX ORDER — ESMOLOL HCL 100MG/10ML
50 VIAL (ML) INTRAVENOUS
Qty: 2500 | Refills: 0 | Status: DISCONTINUED | OUTPATIENT
Start: 2019-04-03 | End: 2019-04-04

## 2019-04-03 RX ORDER — DILTIAZEM HCL 120 MG
10 CAPSULE, EXT RELEASE 24 HR ORAL ONCE
Qty: 0 | Refills: 0 | Status: COMPLETED | OUTPATIENT
Start: 2019-04-03 | End: 2019-04-03

## 2019-04-03 RX ORDER — SODIUM CHLORIDE 9 MG/ML
1000 INJECTION, SOLUTION INTRAVENOUS ONCE
Qty: 0 | Refills: 0 | Status: COMPLETED | OUTPATIENT
Start: 2019-04-03 | End: 2019-04-03

## 2019-04-03 RX ORDER — DILTIAZEM HCL 120 MG
5 CAPSULE, EXT RELEASE 24 HR ORAL
Qty: 125 | Refills: 0 | Status: DISCONTINUED | OUTPATIENT
Start: 2019-04-03 | End: 2019-04-03

## 2019-04-03 RX ORDER — SODIUM CHLORIDE 9 MG/ML
1000 INJECTION, SOLUTION INTRAVENOUS
Qty: 0 | Refills: 0 | Status: DISCONTINUED | OUTPATIENT
Start: 2019-04-03 | End: 2019-04-04

## 2019-04-03 RX ORDER — AMIODARONE HYDROCHLORIDE 400 MG/1
150 TABLET ORAL ONCE
Qty: 0 | Refills: 0 | Status: COMPLETED | OUTPATIENT
Start: 2019-04-03 | End: 2019-04-03

## 2019-04-03 RX ORDER — ESMOLOL HCL 100MG/10ML
36300 VIAL (ML) INTRAVENOUS ONCE
Qty: 0 | Refills: 0 | Status: COMPLETED | OUTPATIENT
Start: 2019-04-03 | End: 2019-04-03

## 2019-04-03 RX ORDER — NOREPINEPHRINE BITARTRATE/D5W 8 MG/250ML
0.05 PLASTIC BAG, INJECTION (ML) INTRAVENOUS
Qty: 16 | Refills: 0 | Status: DISCONTINUED | OUTPATIENT
Start: 2019-04-03 | End: 2019-04-03

## 2019-04-03 RX ORDER — LEVOTHYROXINE SODIUM 125 MCG
25 TABLET ORAL AT BEDTIME
Qty: 0 | Refills: 0 | Status: DISCONTINUED | OUTPATIENT
Start: 2019-04-03 | End: 2019-04-07

## 2019-04-03 RX ORDER — AMIODARONE HYDROCHLORIDE 400 MG/1
0.5 TABLET ORAL
Qty: 900 | Refills: 0 | Status: DISCONTINUED | OUTPATIENT
Start: 2019-04-03 | End: 2019-04-04

## 2019-04-03 RX ORDER — AMIODARONE HYDROCHLORIDE 400 MG/1
1 TABLET ORAL
Qty: 900 | Refills: 0 | Status: DISCONTINUED | OUTPATIENT
Start: 2019-04-03 | End: 2019-04-04

## 2019-04-03 RX ADMIN — MORPHINE SULFATE 4 MILLIGRAM(S): 50 CAPSULE, EXTENDED RELEASE ORAL at 18:28

## 2019-04-03 RX ADMIN — PANTOPRAZOLE SODIUM 40 MILLIGRAM(S): 20 TABLET, DELAYED RELEASE ORAL at 13:16

## 2019-04-03 RX ADMIN — MORPHINE SULFATE 4 MILLIGRAM(S): 50 CAPSULE, EXTENDED RELEASE ORAL at 12:42

## 2019-04-03 RX ADMIN — MORPHINE SULFATE 4 MILLIGRAM(S): 50 CAPSULE, EXTENDED RELEASE ORAL at 16:39

## 2019-04-03 RX ADMIN — Medication 36300 MICROGRAM(S): at 10:35

## 2019-04-03 RX ADMIN — HEPARIN SODIUM 5000 UNIT(S): 5000 INJECTION INTRAVENOUS; SUBCUTANEOUS at 05:42

## 2019-04-03 RX ADMIN — SODIUM CHLORIDE 50 MILLILITER(S): 9 INJECTION, SOLUTION INTRAVENOUS at 10:05

## 2019-04-03 RX ADMIN — Medication 21.75 MICROGRAM(S)/KG/MIN: at 10:07

## 2019-04-03 RX ADMIN — MORPHINE SULFATE 4 MILLIGRAM(S): 50 CAPSULE, EXTENDED RELEASE ORAL at 10:06

## 2019-04-03 RX ADMIN — SODIUM CHLORIDE 2000 MILLILITER(S): 9 INJECTION, SOLUTION INTRAVENOUS at 10:05

## 2019-04-03 RX ADMIN — Medication 5 MG/HR: at 06:57

## 2019-04-03 RX ADMIN — MORPHINE SULFATE 4 MILLIGRAM(S): 50 CAPSULE, EXTENDED RELEASE ORAL at 20:26

## 2019-04-03 RX ADMIN — Medication 21.75 MICROGRAM(S)/KG/MIN: at 10:32

## 2019-04-03 RX ADMIN — Medication 50 MILLIGRAM(S): at 16:00

## 2019-04-03 RX ADMIN — PHENYLEPHRINE HYDROCHLORIDE 1.36 MICROGRAM(S)/KG/MIN: 10 INJECTION INTRAVENOUS at 09:48

## 2019-04-03 RX ADMIN — SODIUM CHLORIDE 10 MILLILITER(S): 9 INJECTION INTRAMUSCULAR; INTRAVENOUS; SUBCUTANEOUS at 05:57

## 2019-04-03 RX ADMIN — MEROPENEM 100 MILLIGRAM(S): 1 INJECTION INTRAVENOUS at 05:42

## 2019-04-03 RX ADMIN — AMIODARONE HYDROCHLORIDE 618 MILLIGRAM(S): 400 TABLET ORAL at 13:06

## 2019-04-03 RX ADMIN — AMIODARONE HYDROCHLORIDE 33.33 MG/MIN: 400 TABLET ORAL at 15:55

## 2019-04-03 RX ADMIN — Medication 10 MILLIGRAM(S): at 04:00

## 2019-04-03 NOTE — PROGRESS NOTE ADULT - SUBJECTIVE AND OBJECTIVE BOX
78 y/o M from [facility], PMHx DMII (on metformin), hypothyroidism, CHF presenting for AMS x2 weeks, slurred speech xcouple days, and dramatic weight loss over the past 3 months. In th Ed pt was hypotensive, had CT angio w/ IV contrast done which showed no dissection but metastatic dx to bone w/ unclear primary. Bloodwork then showed LEE with Cr to 5.1, hyperkalemia to 6.9. Anderson placed and over 1 liter cloudy urine came out. Family was informed of concern for malignancy and pt's frailty, niece spoke with Dr Young and decided to make him DNR/DNI and comfort measures. She and family will come here herself and then the full MOLST form will be filled out. Pt also went into afib with rvr overnight.     Labs:  CARDIAC MARKERS ( 2019 06:56 )  x     / 0.05 ng/mL / x     / x     / x      CARDIAC MARKERS ( 2019 19:25 )  x     / 0.03 ng/mL / x     / x     / x      CARDIAC MARKERS ( 2019 13:05 )  x     / 0.05 ng/mL / x     / x     / x                      12.3   12.92 )-----------( 122      ( 2019 06:56 )             38.7     04-03  154<H>  |  120<H>  |  117<HH>  ----------------------------<  181<H>  4.4   |  21  |  3.5<H>    Ca    7.8<L>      2019 06:56    TPro  5.1<L>  /  Alb  2.4<L>  /  TBili  0.4  /  DBili  x   /  AST  14  /  ALT  18  /  AlkPhos  158<H>  04-03      CAPILLARY BLOOD GLUCOSE    POCT Blood Glucose.: 158 mg/dL (2019 07:31)  POCT Blood Glucose.: 146 mg/dL (2019 01:17)  POCT Blood Glucose.: 132 mg/dL (2019 18:04)  POCT Blood Glucose.: 177 mg/dL (2019 15:51)  POCT Blood Glucose.: 106 mg/dL (2019 14:11)    LIVER FUNCTIONS - ( 2019 06:56 )  Alb: 2.4 g/dL / Pro: 5.1 g/dL / ALK PHOS: 158 U/L / ALT: 18 U/L / AST: 14 U/L / GGT: x           PT/INR - ( 2019 13:05 )   PT: 14.50 sec;   INR: 1.26 ratio   PTT - ( 2019 13:05 )  PTT:25.4 sec    Urinalysis Basic - ( 2019 14:24 )    Color: Yellow / Appearance: Clear / S.015 / pH: x  Gluc: x / Ketone: Negative  / Bili: Negative / Urobili: 0.2 mg/dL   Blood: x / Protein: Trace mg/dL / Nitrite: Negative   Leuk Esterase: Moderate / RBC: 6-10 /HPF / WBC 26-50 /HPF   Sq Epi: x / Non Sq Epi: x / Bacteria: x      I&O's Summary    2019 07:  -  2019 07:00  --------------------------------------------------------  IN: 2700.7 mL / OUT: 3365 mL / NET: -664.3 mL    2019 07:  -  2019 10:58  --------------------------------------------------------  IN: 35 mL / OUT: 300 mL / NET: -265 mL    Physical Exam:  Cardiac: afib, HR uncontrolled in the 120-130s  Lungs: b/l rales throughout, pt was suctioned and about 200cc of brown, thick discharge came out, high asp risk  Abd: distended but soft  LE: no swelling  Neuro: AAOx0, pt not following commands but apparently spoke to the resp therapist before

## 2019-04-03 NOTE — PROGRESS NOTE ADULT - ASSESSMENT
#Acute renal failure, severe metabolic acidosis and hyperkalemia, AMS 2/2 Renal failure?  could be 2/2 bph, keep sanchez in place for now, pt made 1.5L of urine in 24hours  hold metformin  Renal c/s  will trend cmp routinely  will obtain ct head    #Hypotensive on pressor support  start on stress dose of steroids  switch from levo to dejuan to better control hr  source of possible infection is the urine +/- lungs (2/2 asp)   blood and urine cx pending   currently on meropenem  needs speech and swallow eval to check for asp    #New onset afib  trend CE, check echo  Cardio c/s  currently on esmolol (pt requires pressor support)    #Metastatic Dx to bones, unclear primary  check ct head for any mets  chest does not appear to be the source, could be the lumen of the GI tract vs prostate    #Hypernatremia  currently on D5    #DMII  check FS, npo for now until Speech and swallow eval    #Hypothyroid  c/w synthroid      #CHF  check 2DECHO    #DVT/GI PPX    #Goals of care: DNR/DNI over phone discussion between Dr Young (fellow) and Elly Sepulveda (niece, HCP)  will fill out MOLST form when family arrives #Acute renal failure, severe metabolic acidosis and hyperkalemia, AMS 2/2 Renal failure?  could be 2/2 bph, keep sanchez in place for now, pt made 1.5L of urine in 24hours  hold metformin  Renal c/s  will trend cmp routinely  will obtain ct head    #Hypotensive on pressor support  start on stress dose of steroids  switch from levo to dejuan to better control hr  source of possible infection is the urine +/- lungs (2/2 asp)   blood and urine cx pending   currently on meropenem  needs speech and swallow eval to check for asp    #New onset afib  trend CE, check echo  Cardio c/s-start on amio to convert pt, since afib<24hrs no a/c  currently on esmolol (pt requires pressor support)    #Metastatic Dx to bones, unclear primary  check ct head for any mets  chest does not appear to be the source, could be the lumen of the GI tract vs prostate    #Hypernatremia  currently on D5    #DMII  check FS, npo for now until Speech and swallow eval    #Hypothyroid  c/w synthroid      #CHF  check 2DECHO    #DVT/GI PPX    #Goals of care: DNR/DNI over phone discussion between Dr Young (fellow) and Elly Sepulveda (niece, HCP)  will fill out MOLST form when family arrives

## 2019-04-03 NOTE — CONSULT NOTE ADULT - SUBJECTIVE AND OBJECTIVE BOX
Patient is a 79y old  Male who presents with a chief complaint of altered mental status (2019 12:07)      INTERVAL HPI/OVERNIGHT EVENTS:        PAST MEDICAL & SURGICAL HISTORY:  CHF (congestive heart failure)  PVD (peripheral vascular disease)  High blood cholesterol  Hyperkalemia  Hypothyroid  BPH (benign prostatic hyperplasia)  Gout  Hard of hearing  Diabetes mellitus, type 2  History of thoracic aortic aneurysm repair      REVIEW OF SYSTEMS: Total of twelve systems have been reviewed with patient and found to be negative unless mentioned in HPI      SOCIAL HISTORY  Alcohol: Does not drink  Tobacco: Does not smoke  Illicit substance use: None      FAMILY HISTORY: Non contributory to the present illness        No Known Allergies        T(C): 35.2 (19 @ 15:00), Max: 36.5 (19 @ 04:51)  HR: 105 (19 @ 18:03) (92 - 134)  BP: 105/56 (19 @ 18:03) (73/50 - 124/66)  RR: 22 (19 @ 18:03) (19 - 50)  SpO2: 88% (19 @ 18:03) (86% - 97%)      19 @ 07:01  -  19 @ 07:00  --------------------------------------------------------  IN: 2700.7 mL / OUT: 3365 mL / NET: -664.3 mL    19 @ 07:01  -  19 @ 21:56  --------------------------------------------------------  IN: 1048.2 mL / OUT: 925 mL / NET: 123.2 mL        PHYSICAL EXAM:  GENERAL: Not in distress   CHEST/LUNG:  Aire ntry bilaterally  HEART: s1 and s2 present  ABDOMEN:  Nontender and  Nondistended  EXTREMITIES: No pedal  edema  CNS: Awake and Alert      LABS:                        12.3   12.92 )-----------( 122      ( 2019 06:56 )             38.7         155<H>  |  122<H>  |  107<HH>  ----------------------------<  146<H>  4.5   |  20  |  3.4<H>    Ca    8.0<L>      2019 10:54    TPro  4.8<L>  /  Alb  2.3<L>  /  TBili  0.4  /  DBili  x   /  AST  13  /  ALT  16  /  AlkPhos  135<H>      PT/INR - ( 2019 13:05 )   PT: 14.50 sec;   INR: 1.26 ratio         PTT - ( 2019 13:05 )  PTT:25.4 sec  Urinalysis Basic - ( 2019 14:24 )    Color: Yellow / Appearance: Clear / S.015 / pH: x  Gluc: x / Ketone: Negative  / Bili: Negative / Urobili: 0.2 mg/dL   Blood: x / Protein: Trace mg/dL / Nitrite: Negative   Leuk Esterase: Moderate / RBC: 6-10 /HPF / WBC 26-50 /HPF   Sq Epi: x / Non Sq Epi: x / Bacteria: x      CAPILLARY BLOOD GLUCOSE      POCT Blood Glucose.: 145 mg/dL (2019 12:10)  POCT Blood Glucose.: 158 mg/dL (2019 07:31)  POCT Blood Glucose.: 146 mg/dL (2019 01:17)        Urinalysis Basic - ( 2019 14:24 )    Color: Yellow / Appearance: Clear / S.015 / pH: x  Gluc: x / Ketone: Negative  / Bili: Negative / Urobili: 0.2 mg/dL   Blood: x / Protein: Trace mg/dL / Nitrite: Negative   Leuk Esterase: Moderate / RBC: 6-10 /HPF / WBC 26-50 /HPF   Sq Epi: x / Non Sq Epi: x / Bacteria: x        MEDICATIONS  (STANDING):  amiodarone Infusion 0.5 mG/Min (16.667 mL/Hr) IV Continuous <Continuous>  amiodarone Infusion 1 mG/Min (33.333 mL/Hr) IV Continuous <Continuous>  aspirin  chewable 81 milliGRAM(s) Oral daily  atorvastatin 20 milliGRAM(s) Oral at bedtime  dextrose 5%. 1000 milliLiter(s) (50 mL/Hr) IV Continuous <Continuous>  esMOLOL  Infusion 50 MICROgram(s)/kG/Min (21.75 mL/Hr) IV Continuous <Continuous>  heparin  Injectable 5000 Unit(s) SubCutaneous every 8 hours  hydrocortisone sodium succinate Injectable 50 milliGRAM(s) IV Push every 8 hours  levothyroxine Injectable 25 MICROGram(s) IV Push at bedtime  meropenem  IVPB 500 milliGRAM(s) IV Intermittent every 24 hours  pantoprazole  Injectable 40 milliGRAM(s) IV Push daily  tamsulosin 0.4 milliGRAM(s) Oral at bedtime    MEDICATIONS  (PRN):  morphine  - Injectable 4 milliGRAM(s) IV Push every 2 hours PRN pain, dyspnea  sodium chloride 0.9% lock flush 10 milliLiter(s) IV Push every 8 hours PRN Pre/post blood products, medications, blood draw, and to maintain line patency          RADIOLOGY & ADDITIONAL TESTS: Patient is a 79y old  Male who presents with a chief complaint of altered mental status (2019 12:07)      REVIEW OF SYSTEMS: Unable to obtain due to mental  status unless mentioned in HPI        PAST MEDICAL & SURGICAL HISTORY:  CHF (congestive heart failure)  PVD (peripheral vascular disease)  High blood cholesterol  Hyperkalemia  Hypothyroid  BPH (benign prostatic hyperplasia)  Gout, Hard of hearing  Diabetes mellitus, type 2  History of thoracic aortic aneurysm repair        SOCIAL HISTORY  Alcohol: Does not drink  Tobacco: Does not smoke  Illicit substance use: None      FAMILY HISTORY: Non contributory to the present illness        ALLERGIES: No Known Allergies        T(C): 35.2 (19 @ 15:00), Max: 36.5 (19 @ 04:51)  HR: 105 (19 @ 18:03) (92 - 134)  BP: 105/56 (19 @ 18:03) (73/50 - 124/66)  RR: 22 (19 @ 18:03) (19 - 50)  SpO2: 88% (19 @ 18:03) (86% - 97%)      19 @ 07:01  -  19 @ 07:00  --------------------------------------------------------  IN: 2700.7 mL / OUT: 3365 mL / NET: -664.3 mL        PHYSICAL EXAM:  GENERAL: Not in distress, ON NRB  CHEST/LUNG:  Air entry bilaterally  HEART: s1 and s2 present  ABDOMEN:  Nondistended   : Anderson catheter in placed  EXTREMITIES: No pedal  edema  CNS: Not responsive        LABS:                        12.3   12.92 )-----------( 122      ( 2019 06:56 )             38.7       04-03    155<H>  |  122<H>  |  107<HH>  ----------------------------<  146<H>  4.5   |  20  |  3.4<H>    Ca    8.0<L>      2019 10:54    TPro  4.8<L>  /  Alb  2.3<L>  /  TBili  0.4  /  DBili  x   /  AST  13  /  ALT  16  /  AlkPhos  135<H>  04-03    PT/INR - ( 2019 13:05 )   PT: 14.50 sec;   INR: 1.26 ratio         PTT - ( 2019 13:05 )  PTT:25.4 sec  Urinalysis Basic - ( 2019 14:24 )    Color: Yellow / Appearance: Clear / S.015 / pH: x  Gluc: x / Ketone: Negative  / Bili: Negative / Urobili: 0.2 mg/dL   Blood: x / Protein: Trace mg/dL / Nitrite: Negative   Leuk Esterase: Moderate / RBC: 6-10 /HPF / WBC 26-50 /HPF   Sq Epi: x / Non Sq Epi: x / Bacteria: x      CAPILLARY BLOOD GLUCOSE  POCT Blood Glucose.: 145 mg/dL (2019 12:10)  POCT Blood Glucose.: 158 mg/dL (2019 07:31)  POCT Blood Glucose.: 146 mg/dL (2019 01:17)        MEDICATIONS  (STANDING):  amiodarone Infusion 0.5 mG/Min (16.667 mL/Hr) IV Continuous <Continuous>  amiodarone Infusion 1 mG/Min (33.333 mL/Hr) IV Continuous <Continuous>  aspirin  chewable 81 milliGRAM(s) Oral daily  atorvastatin 20 milliGRAM(s) Oral at bedtime  dextrose 5%. 1000 milliLiter(s) (50 mL/Hr) IV Continuous <Continuous>  esMOLOL  Infusion 50 MICROgram(s)/kG/Min (21.75 mL/Hr) IV Continuous <Continuous>  heparin  Injectable 5000 Unit(s) SubCutaneous every 8 hours  hydrocortisone sodium succinate Injectable 50 milliGRAM(s) IV Push every 8 hours  levothyroxine Injectable 25 MICROGram(s) IV Push at bedtime  meropenem  IVPB 500 milliGRAM(s) IV Intermittent every 24 hours  pantoprazole  Injectable 40 milliGRAM(s) IV Push daily  tamsulosin 0.4 milliGRAM(s) Oral at bedtime    MEDICATIONS  (PRN):  morphine  - Injectable 4 milliGRAM(s) IV Push every 2 hours PRN pain, dyspnea  sodium chloride 0.9% lock flush 10 milliLiter(s) IV Push every 8 hours PRN Pre/post blood products, medications, blood draw, and to maintain line patency        RADIOLOGY & ADDITIONAL TESTS:    < from: Xray Chest 1 View- PORTABLE-Urgent (19 @ 09:19) >  Stable bibasilar opacities. No pleural effusion or pneumothorax.   Poststernotomy.      < end of copied text >        < from: CT Angio Chest Dissection Protocol (19 @ 14:30) >  1.  No aortic dissection or hematoma. Aneurysmal dilatation of the   ascending and descending thoracic aorta to 4.8 and 4.5 cm, respectively,   post ascending aortic repair..    2.  Numerous sclerotic lesions scattered throughout the spine, ribs and   partially imaged pelvis are consistent with metastatic disease.    3.  Mildly dilated air and fluid-filled esophagus with wall thickening,   correlate for esophagitis.    4.  Bibasilar lung atelectasis versus consolidation.     5.  Large bilateral renal cysts.      < end of copied text >

## 2019-04-03 NOTE — SWALLOW BEDSIDE ASSESSMENT ADULT - SLP PERTINENT HISTORY OF CURRENT PROBLEM
Patient admit with AMS confused. In ED found hypotensive, in Acute kidney Injury and with acute urinary retention. Anderson placed and over 1 liter cloudy urine came out.

## 2019-04-03 NOTE — PROGRESS NOTE ADULT - ASSESSMENT
Impression:  Sepsis/ septic shock  Urosepsis/ PNA  LEE/ hyperkalemia  Metastatic disease. ? etiology   A fib.     IMPRESSION:      PLAN:     CNS: NO sedation. CT head.     HEENT: Oral care    PULMONARY:  HOB @ 45 degrees. Meropenem for now. Sputum culture. Aspiration precaution.     CARDIOVASCULAR: Keep in D50. Neosynephine for now.     GI: GI prophylaxis.  Feeding as tolerated. Speech and swallow eval    RENAL:  Follow up lytes.  Correct as needed. Continue with IV hydration. Avoid nephrotoxic.     INFECTIOUS DISEASE: Follow up cultures. Continue with meropenem for now. Blood culture. MRSA nares.     HEMATOLOGICAL:  DVT prophylaxis. Out patient heme onc work up for malignancy . Hydrocortisone 50 Q8.    ENDOCRINE:  Follow up FS.  Insulin protocol if needed.    MUSCULOSKELETAL:    ICU.     Prognosis grave.

## 2019-04-03 NOTE — CONSULT NOTE ADULT - ASSESSMENT
79/M with LEE on CKD 3 (creat was reportedly 1.7 mg% as per PMD), DM, HTN, AAA repair, admitted with change in SM found to be in LEE.    LEE due to retention and and volume depletion  - improving with IVF and Anderson cath  - cont + balance  -cont pressors and D5W at 100 cc/hr  - urine lytes, creat, prot/creat ratio  - kidneys no hydro, renal cysts  - monitor for SHEREEN contrast given 4/2/19  - bone lesions mets noted, unkown primary  - strict is and OS    Hypernatremia - large free water deficit - due to free water diuresis (check Urine Osm to r/o DI)   or postobstructive polyuria from retention  -check BMP q 6 hrs, avoid drop in NA>6 mEq per day    Sepsis - on Merrem      Prognosis guarded  D/W HS

## 2019-04-03 NOTE — CONSULT NOTE ADULT - ASSESSMENT
A 79Y M with AMS, DNR/DNI status    # Encephalopathy  # ARF  # Sepsis- Hypothermia + Leukocytosis + UTI  # UTI  # Bibasilar pneumonia      would recommend:  1. Follow up blood and urine culture  2. Aspiration precaution  3. Supplemental oxygenation and bronchodilator as needed  4. Continue Meropenem If within GOAL Of CARE    d/w ICU team    will follow the patient with you and make further recommendation based on the clinical course and Lab results  Thank you for the opportunity to participate in Mr. HAM's care

## 2019-04-03 NOTE — CONSULT NOTE ADULT - SUBJECTIVE AND OBJECTIVE BOX
CARDIOLOGY CONSULT NOTE     CHIEF COMPLAINT/REASON FOR CONSULT:    HPI:  79 year male who lives at [facility], presented to the hospital today after the family noticed a significant change in his mental status. Over the past 2 weeks he didn't answer his phone, he seemed confused and was not himself (he  normally loves to talk). Over the past few days he had slurring of his speech. In addition he has had a dramatic weight loss over the past 3 months. In ED the pt was found hypotensive, in Acute kidney Injury and with acute urinary retention. Anderson placed and over 1 liter cloudy urine came out. (02 Apr 2019 15:30)      PAST MEDICAL & SURGICAL HISTORY:  CHF (congestive heart failure)  PVD (peripheral vascular disease)  High blood cholesterol  Hyperkalemia  Hypothyroid  BPH (benign prostatic hyperplasia)  Gout  Hard of hearing  Diabetes mellitus, type 2  History of thoracic aortic aneurysm repair      Cardiac Risks:   [ ]HTN, [x ] DM, [ ] Smoking, [ ] FH,  [x ] Lipids        MEDICATIONS:  MEDICATIONS  (STANDING):  aspirin  chewable 81 milliGRAM(s) Oral daily  atorvastatin 20 milliGRAM(s) Oral at bedtime  dextrose 5%. 1000 milliLiter(s) (50 mL/Hr) IV Continuous <Continuous>  esMOLOL  Infusion 50 MICROgram(s)/kG/Min (21.75 mL/Hr) IV Continuous <Continuous>  heparin  Injectable 5000 Unit(s) SubCutaneous every 8 hours  hydrocortisone sodium succinate Injectable 50 milliGRAM(s) IV Push every 8 hours  levothyroxine Injectable 25 MICROGram(s) IV Push at bedtime  meropenem  IVPB 500 milliGRAM(s) IV Intermittent every 24 hours  pantoprazole  Injectable 40 milliGRAM(s) IV Push daily  phenylephrine    Infusion 0.1 MICROgram(s)/kG/Min (1.359 mL/Hr) IV Continuous <Continuous>  tamsulosin 0.4 milliGRAM(s) Oral at bedtime      FAMILY HISTORY:      SOCIAL HISTORY:      Marital status  single  Allergies    No Known Allergies        	    REVIEW OF SYSTEMS:    [x ] Unable to obtain    PHYSICAL EXAM:  T(C): 36.1 (04-03-19 @ 11:00), Max: 36.5 (04-03-19 @ 04:51)  HR: 112 (04-03-19 @ 11:33) (55 - 134)  BP: 75/56 (04-03-19 @ 11:33) (73/50 - 124/66)  RR: 27 (04-03-19 @ 11:33) (17 - 53)  SpO2: 92% (04-03-19 @ 11:33) (88% - 100%)  Wt(kg): --  I&O's Summary    02 Apr 2019 07:01  -  03 Apr 2019 07:00  --------------------------------------------------------  IN: 2700.7 mL / OUT: 3365 mL / NET: -664.3 mL    03 Apr 2019 07:01  -  03 Apr 2019 12:08  --------------------------------------------------------  IN: 366.8 mL / OUT: 505 mL / NET: -138.2 mL        Appearance: Normal	  Psychiatry: A & O x 3, Mood & affect appropriate  HEENT:   Normal oral mucosa, PERRL, EOMI	  Lymphatic: No lymphadenopathy  Cardiovascular: Normal S1 S2 irreg No JVD, No murmurs  Respiratory: Lungs clear to auscultation	  Gastrointestinal:  Soft, Non-tender, + BS	  Skin: No rashes, No ecchymoses, No cyanosis	  Neurologic: Non-focal  Extremities: Normal range of motion, No clubbing, cyanosis or edema  Vascular: Peripheral pulses palpable 2+ bilaterally      ECG:  	not available    	  LABS:	 	    CARDIAC MARKERS:          Serum Pro-Brain Natriuretic Peptide: 5261 pg/mL (04-02 @ 13:05)                            12.3   12.92 )-----------( 122      ( 03 Apr 2019 06:56 )             38.7     04-03    154<H>  |  120<H>  |  117<HH>  ----------------------------<  181<H>  4.4   |  21  |  3.5<H>    Ca    7.8<L>      03 Apr 2019 06:56    TPro  5.1<L>  /  Alb  2.4<L>  /  TBili  0.4  /  DBili  x   /  AST  14  /  ALT  18  /  AlkPhos  158<H>  04-03    PT/INR - ( 02 Apr 2019 13:05 )   PT: 14.50 sec;   INR: 1.26 ratio         PTT - ( 02 Apr 2019 13:05 )  PTT:25.4 sec  proBNP: Serum Pro-Brain Natriuretic Peptide: 5261 pg/mL (04-02 @ 13:05)

## 2019-04-03 NOTE — CONSULT NOTE ADULT - SUBJECTIVE AND OBJECTIVE BOX
NEPHROLOGY CONSULTATION NOTE    Patient is a 79y Male whom presented to the hospital with change in MS for 2 weeks, found to be in LEE.  Sanchez placed in ED 1300 cc. Became hypotensive later on, started on pressors.  Developed new Afib.  PMH: DM was on Metformin, GOut, Thoracic AAA repair, CHF.      PAST MEDICAL & SURGICAL HISTORY:  CHF (congestive heart failure)  PVD (peripheral vascular disease)  High blood cholesterol  Hyperkalemia  Hypothyroid  BPH (benign prostatic hyperplasia)  Gout  Hard of hearing  Diabetes mellitus, type 2  History of thoracic aortic aneurysm repair    Allergies:  No Known Allergies    Home Medications Reviewed  Hospital Medications:   MEDICATIONS  (STANDING):  aspirin  chewable 81 milliGRAM(s) Oral daily  atorvastatin 20 milliGRAM(s) Oral at bedtime  dextrose 5%. 1000 milliLiter(s) (50 mL/Hr) IV Continuous <Continuous>  esMOLOL  Infusion 50 MICROgram(s)/kG/Min (21.75 mL/Hr) IV Continuous <Continuous>  heparin  Injectable 5000 Unit(s) SubCutaneous every 8 hours  hydrocortisone sodium succinate Injectable 50 milliGRAM(s) IV Push every 8 hours  levothyroxine Injectable 25 MICROGram(s) IV Push at bedtime  meropenem  IVPB 500 milliGRAM(s) IV Intermittent every 24 hours  pantoprazole  Injectable 40 milliGRAM(s) IV Push daily  phenylephrine    Infusion 0.1 MICROgram(s)/kG/Min (1.359 mL/Hr) IV Continuous <Continuous>  tamsulosin 0.4 milliGRAM(s) Oral at bedtime      SOCIAL HISTORY:  Denies ETOH,Smoking,   FAMILY HISTORY:    Yes      REVIEW OF SYSTEMS:  Unable to obtain, AOx1.   SKIN: No itching, burning, rashes, or lesions   VASCULAR: No bilateral lower extremity edema.   All other review of systems is negative unless indicated above.    VITALS:  T(F): 95.9 (19 @ 07:05), Max: 97.7 (19 @ 04:51)  HR: 130 (19 @ 09:03)  BP: 83/53 (19 @ 09:03)  RR: 39 (19 @ 09:03)  SpO2: 89% (19 @ 09:03)    04- @ 07:01  -   @ 07:00  --------------------------------------------------------  IN: 2700.7 mL / OUT: 3365 mL / NET: -664.3 mL     @ 07:01  -   @ 11:07  --------------------------------------------------------  IN: 35 mL / OUT: 300 mL / NET: -265 mL      Height (cm): 172.72 ( 16:27)  Weight (kg): 72.5 ( 16:27)  BMI (kg/m2): 24.3 ( 16:27)  BSA (m2): 1.86 ( 16:27)    19 @ 07:01  -  19 @ 07:00  --------------------------------------------------------  IN: 0 mL / OUT: 3365 mL / NET: -3365 mL    19 @ 07:01  -  19 @ 11:07  --------------------------------------------------------  IN: 0 mL / OUT: 200 mL / NET: -200 mL      I&O's Detail    2019 07:01  -  2019 07:00  --------------------------------------------------------  IN:    dextrose 5%: 1400 mL    IV PiggyBack: 50 mL    norepinephrine Infusion: 250.7 mL    Sodium Chloride 0.9% IV Bolus: 1000 mL  Total IN: 2700.7 mL    OUT:    Indwelling Catheter - Urethral: 3365 mL  Total OUT: 3365 mL    Total NET: -664.3 mL      2019 07:01  -  2019 11:07  --------------------------------------------------------  IN:    diltiazem Infusion: 20 mL    norepinephrine Infusion: 15 mL  Total IN: 35 mL    OUT:    Indwelling Catheter - Urethral: 200 mL    Other: 100 mL  Total OUT: 300 mL    Total NET: -265 mL            PHYSICAL EXAM:  Constitutional: NAD  HEENT: anicteric sclera, dry mucous membranes, gargling BS  Neck: No JVD  Respiratory: rhochi+  Cardiovascular: S1, S2, RRR  Gastrointestinal: BS+, soft, NT/ND  Extremities:  No peripheral edema  Neurological: responds to naem only  : No CVA tenderness.  sanchez+.   Skin: No rashes  Vascular Access:    LABS:      154<H>  |  120<H>  |  117<HH>  ----------------------------<  181<H>  4.4   |  21  |  3.5<H>    Ca    7.8<L>      2019 06:56    TPro  5.1<L>  /  Alb  2.4<L>  /  TBili  0.4  /  DBili      /  AST  14  /  ALT  18  /  AlkPhos  158<H>      Creatinine Trend: 3.5 <--, 4.2 <--, 4.5 <--, 5.1 <--                        12.3   12.92 )-----------( 122      ( 2019 06:56 )             38.7     Urine Studies:  Urinalysis Basic - ( 2019 14:24 )    Color: Yellow / Appearance: Clear / S.015 / pH:   Gluc:  / Ketone: Negative  / Bili: Negative / Urobili: 0.2 mg/dL   Blood:  / Protein: Trace mg/dL / Nitrite: Negative   Leuk Esterase: Moderate / RBC: 6-10 /HPF / WBC 26-50 /HPF   Sq Epi:  / Non Sq Epi:  / Bacteria:                 RADIOLOGY & ADDITIONAL STUDIES:    < from: Xray Chest 1 View- PORTABLE-Urgent (19 @ 09:19) >    Stable bibasilar opacities. No pleural effusion or pneumothorax.   Poststernotomy.    < end of copied text >  < from: CT Angio Chest Dissection Protocol (19 @ 14:30) >  1.  No aortic dissection or hematoma. Aneurysmal dilatation of the   ascending and descending thoracic aorta to 4.8 and 4.5 cm, respectively,   post ascending aortic repair..    2.  Numerous sclerotic lesions scattered throughout the spine, ribs and   partially imaged pelvis are consistent with metastatic disease.    3.  Mildly dilated air and fluid-filled esophagus with wall thickening,   correlate for esophagitis.    4.  Bibasilar lung atelectasis versus consolidation.     5.  Large bilateral renal cysts.    < end of copied text >

## 2019-04-03 NOTE — CONSULT NOTE ADULT - ASSESSMENT
Patient appears septic., On pressors. He is in afib . Will try amiodarone to convert. Prognosis poor

## 2019-04-03 NOTE — PROGRESS NOTE ADULT - SUBJECTIVE AND OBJECTIVE BOX
Patient is a 79y old  Male who presents with a chief complaint of altered mental status (2019 15:30)        SUBJECTIVE: Patient on face mask 50 %.      REVIEW OF SYSTEMS:  See HPI    PHYSICAL EXAM  Vital Signs Last 24 Hrs  T(C): 35.5 (2019 07:05), Max: 36.5 (2019 04:51)  T(F): 95.9 (2019 07:05), Max: 97.7 (2019 04:51)  HR: 92 (2019 05:51) (55 - 128)  BP: 104/64 (2019 05:51) (76/43 - 121/59)  BP(mean): 79 (2019 05:51) (53 - 90)  RR: 40 (2019 07:40) (17 - 53)  SpO2: 88% (2019 07:40) (88% - 100%)    General: Awake. Gurgling.   HEENT: BLANQUITA               Lymphatic system: No Cervical LN    Respiratory: Minor BS  Cardiovascular: Regular  Gastrointestinal: Soft. + BS  Musculoskeletal: No clubbing.  moves all extremities.  Full range of motion    Skin: Warm.  Intact  Neurological: No motor or sensory deficit      19 @ 07:01  -  19 @ 07:00  --------------------------------------------------------  IN:    dextrose 5%: 1400 mL    IV PiggyBack: 50 mL    norepinephrine Infusion: 250.7 mL    Sodium Chloride 0.9% IV Bolus: 1000 mL  Total IN: 2700.7 mL    OUT:    Indwelling Catheter - Urethral: 3365 mL  Total OUT: 3365 mL    Total NET: -664.3 mL      19 @ 07:01  -  19 @ 09:03  --------------------------------------------------------  IN:    diltiazem Infusion: 20 mL    norepinephrine Infusion: 15 mL  Total IN: 35 mL    OUT:    Indwelling Catheter - Urethral: 200 mL    Other: 100 mL  Total OUT: 300 mL    Total NET: -265 mL          LABS:                          12.3   12.92 )-----------( 122      ( 2019 06:56 )             38.7                                                   154<H>  |  120<H>  |  x   ----------------------------<  181<H>  4.4   |  21  |  3.5<H>    Ca    7.8<L>      2019 06:56    TPro  5.1<L>  /  Alb  2.4<L>  /  TBili  0.4  /  DBili  x   /  AST  14  /  ALT  18  /  AlkPhos  158<H>        PT/INR - ( 2019 13:05 )   PT: 14.50 sec;   INR: 1.26 ratio         PTT - ( 2019 13:05 )  PTT:25.4 sec                                       Urinalysis Basic - ( 2019 14:24 )    Color: Yellow / Appearance: Clear / S.015 / pH: x  Gluc: x / Ketone: Negative  / Bili: Negative / Urobili: 0.2 mg/dL   Blood: x / Protein: Trace mg/dL / Nitrite: Negative   Leuk Esterase: Moderate / RBC: 6-10 /HPF / WBC 26-50 /HPF   Sq Epi: x / Non Sq Epi: x / Bacteria: x        CARDIAC MARKERS ( 2019 06:56 )  x     / 0.05 ng/mL / x     / x     / x      CARDIAC MARKERS ( 2019 19:25 )  x     / 0.03 ng/mL / x     / x     / x      CARDIAC MARKERS ( 2019 13:05 )  x     / 0.05 ng/mL / x     / x     / x                                                LIVER FUNCTIONS - ( 2019 06:56 )  Alb: 2.4 g/dL / Pro: 5.1 g/dL / ALK PHOS: 158 U/L / ALT: 18 U/L / AST: 14 U/L / GGT: x                                                       MEDICATIONS  (STANDING):  aspirin  chewable 81 milliGRAM(s) Oral daily  atorvastatin 20 milliGRAM(s) Oral at bedtime  heparin  Injectable 5000 Unit(s) SubCutaneous every 8 hours  levothyroxine 50 MICROGram(s) Oral daily  meropenem  IVPB 500 milliGRAM(s) IV Intermittent every 24 hours  pantoprazole    Tablet 40 milliGRAM(s) Oral before breakfast  tamsulosin 0.4 milliGRAM(s) Oral at bedtime    MEDICATIONS  (PRN):  sodium chloride 0.9% lock flush 10 milliLiter(s) IV Push every 8 hours PRN Pre/post blood products, medications, blood draw, and to maintain line patency

## 2019-04-04 DIAGNOSIS — I48.91 UNSPECIFIED ATRIAL FIBRILLATION: ICD-10-CM

## 2019-04-04 LAB
ALBUMIN SERPL ELPH-MCNC: 2.2 G/DL — LOW (ref 3.5–5.2)
ALBUMIN SERPL ELPH-MCNC: 2.4 G/DL — LOW (ref 3.5–5.2)
ALP SERPL-CCNC: 150 U/L — HIGH (ref 30–115)
ALP SERPL-CCNC: 153 U/L — HIGH (ref 30–115)
ALT FLD-CCNC: 16 U/L — SIGNIFICANT CHANGE UP (ref 0–41)
ALT FLD-CCNC: 16 U/L — SIGNIFICANT CHANGE UP (ref 0–41)
ANION GAP SERPL CALC-SCNC: 12 MMOL/L — SIGNIFICANT CHANGE UP (ref 7–14)
ANION GAP SERPL CALC-SCNC: 8 MMOL/L — SIGNIFICANT CHANGE UP (ref 7–14)
AST SERPL-CCNC: 15 U/L — SIGNIFICANT CHANGE UP (ref 0–41)
AST SERPL-CCNC: 15 U/L — SIGNIFICANT CHANGE UP (ref 0–41)
BASOPHILS # BLD AUTO: 0.02 K/UL — SIGNIFICANT CHANGE UP (ref 0–0.2)
BASOPHILS NFR BLD AUTO: 0.2 % — SIGNIFICANT CHANGE UP (ref 0–1)
BILIRUB SERPL-MCNC: 0.4 MG/DL — SIGNIFICANT CHANGE UP (ref 0.2–1.2)
BILIRUB SERPL-MCNC: 0.4 MG/DL — SIGNIFICANT CHANGE UP (ref 0.2–1.2)
BUN SERPL-MCNC: 87 MG/DL — CRITICAL HIGH (ref 10–20)
BUN SERPL-MCNC: 91 MG/DL — CRITICAL HIGH (ref 10–20)
CALCIUM SERPL-MCNC: 8.6 MG/DL — SIGNIFICANT CHANGE UP (ref 8.5–10.1)
CALCIUM SERPL-MCNC: 8.8 MG/DL — SIGNIFICANT CHANGE UP (ref 8.5–10.1)
CHLORIDE SERPL-SCNC: 120 MMOL/L — HIGH (ref 98–110)
CHLORIDE SERPL-SCNC: 123 MMOL/L — HIGH (ref 98–110)
CO2 SERPL-SCNC: 21 MMOL/L — SIGNIFICANT CHANGE UP (ref 17–32)
CO2 SERPL-SCNC: 22 MMOL/L — SIGNIFICANT CHANGE UP (ref 17–32)
CREAT SERPL-MCNC: 2.5 MG/DL — HIGH (ref 0.7–1.5)
CREAT SERPL-MCNC: 2.8 MG/DL — HIGH (ref 0.7–1.5)
CULTURE RESULTS: NO GROWTH — SIGNIFICANT CHANGE UP
EOSINOPHIL # BLD AUTO: 0 K/UL — SIGNIFICANT CHANGE UP (ref 0–0.7)
EOSINOPHIL NFR BLD AUTO: 0 % — SIGNIFICANT CHANGE UP (ref 0–8)
GLUCOSE BLDC GLUCOMTR-MCNC: 140 MG/DL — HIGH (ref 70–99)
GLUCOSE BLDC GLUCOMTR-MCNC: 167 MG/DL — HIGH (ref 70–99)
GLUCOSE BLDC GLUCOMTR-MCNC: 183 MG/DL — HIGH (ref 70–99)
GLUCOSE BLDC GLUCOMTR-MCNC: 226 MG/DL — HIGH (ref 70–99)
GLUCOSE SERPL-MCNC: 179 MG/DL — HIGH (ref 70–99)
GLUCOSE SERPL-MCNC: 249 MG/DL — HIGH (ref 70–99)
HCT VFR BLD CALC: 35.8 % — LOW (ref 42–52)
HGB BLD-MCNC: 11.4 G/DL — LOW (ref 14–18)
IMM GRANULOCYTES NFR BLD AUTO: 1.1 % — HIGH (ref 0.1–0.3)
LYMPHOCYTES # BLD AUTO: 0.39 K/UL — LOW (ref 1.2–3.4)
LYMPHOCYTES # BLD AUTO: 3.8 % — LOW (ref 20.5–51.1)
MAGNESIUM SERPL-MCNC: 2.1 MG/DL — SIGNIFICANT CHANGE UP (ref 1.8–2.4)
MCHC RBC-ENTMCNC: 30 PG — SIGNIFICANT CHANGE UP (ref 27–31)
MCHC RBC-ENTMCNC: 31.8 G/DL — LOW (ref 32–37)
MCV RBC AUTO: 94.2 FL — HIGH (ref 80–94)
MONOCYTES # BLD AUTO: 0.33 K/UL — SIGNIFICANT CHANGE UP (ref 0.1–0.6)
MONOCYTES NFR BLD AUTO: 3.2 % — SIGNIFICANT CHANGE UP (ref 1.7–9.3)
NEUTROPHILS # BLD AUTO: 9.38 K/UL — HIGH (ref 1.4–6.5)
NEUTROPHILS NFR BLD AUTO: 91.7 % — HIGH (ref 42.2–75.2)
NRBC # BLD: 0 /100 WBCS — SIGNIFICANT CHANGE UP (ref 0–0)
OSMOLALITY UR: 267 MOS/KG — SIGNIFICANT CHANGE UP (ref 50–1400)
PLATELET # BLD AUTO: 91 K/UL — LOW (ref 130–400)
POTASSIUM SERPL-MCNC: 4.2 MMOL/L — SIGNIFICANT CHANGE UP (ref 3.5–5)
POTASSIUM SERPL-MCNC: 4.3 MMOL/L — SIGNIFICANT CHANGE UP (ref 3.5–5)
POTASSIUM SERPL-SCNC: 4.2 MMOL/L — SIGNIFICANT CHANGE UP (ref 3.5–5)
POTASSIUM SERPL-SCNC: 4.3 MMOL/L — SIGNIFICANT CHANGE UP (ref 3.5–5)
PROT SERPL-MCNC: 4.9 G/DL — LOW (ref 6–8)
PROT SERPL-MCNC: 5.2 G/DL — LOW (ref 6–8)
RBC # BLD: 3.8 M/UL — LOW (ref 4.7–6.1)
RBC # FLD: 16.5 % — HIGH (ref 11.5–14.5)
SODIUM SERPL-SCNC: 153 MMOL/L — HIGH (ref 135–146)
SODIUM SERPL-SCNC: 153 MMOL/L — HIGH (ref 135–146)
SPECIMEN SOURCE: SIGNIFICANT CHANGE UP
WBC # BLD: 10.23 K/UL — SIGNIFICANT CHANGE UP (ref 4.8–10.8)
WBC # FLD AUTO: 10.23 K/UL — SIGNIFICANT CHANGE UP (ref 4.8–10.8)

## 2019-04-04 PROCEDURE — 71045 X-RAY EXAM CHEST 1 VIEW: CPT | Mod: 26

## 2019-04-04 RX ORDER — AMIODARONE HYDROCHLORIDE 400 MG/1
0.5 TABLET ORAL
Qty: 900 | Refills: 0 | Status: DISCONTINUED | OUTPATIENT
Start: 2019-04-04 | End: 2019-04-05

## 2019-04-04 RX ORDER — ALLOPURINOL 300 MG
100 TABLET ORAL
Qty: 30 | Refills: 0 | OUTPATIENT
Start: 2019-04-04 | End: 2019-05-03

## 2019-04-04 RX ORDER — AMIODARONE HYDROCHLORIDE 400 MG/1
1 TABLET ORAL
Qty: 900 | Refills: 0 | Status: DISCONTINUED | OUTPATIENT
Start: 2019-04-04 | End: 2019-04-05

## 2019-04-04 RX ORDER — ALLOPURINOL 300 MG
0 TABLET ORAL
Qty: 0 | Refills: 0 | COMMUNITY

## 2019-04-04 RX ORDER — SODIUM CHLORIDE 9 MG/ML
1000 INJECTION, SOLUTION INTRAVENOUS
Qty: 0 | Refills: 0 | Status: DISCONTINUED | OUTPATIENT
Start: 2019-04-04 | End: 2019-04-07

## 2019-04-04 RX ORDER — AMIODARONE HYDROCHLORIDE 400 MG/1
150 TABLET ORAL ONCE
Qty: 0 | Refills: 0 | Status: COMPLETED | OUTPATIENT
Start: 2019-04-04 | End: 2019-04-04

## 2019-04-04 RX ADMIN — Medication 50 MILLIGRAM(S): at 14:00

## 2019-04-04 RX ADMIN — SODIUM CHLORIDE 50 MILLILITER(S): 9 INJECTION, SOLUTION INTRAVENOUS at 09:44

## 2019-04-04 RX ADMIN — AMIODARONE HYDROCHLORIDE 16.67 MG/MIN: 400 TABLET ORAL at 15:02

## 2019-04-04 RX ADMIN — HEPARIN SODIUM 5000 UNIT(S): 5000 INJECTION INTRAVENOUS; SUBCUTANEOUS at 21:16

## 2019-04-04 RX ADMIN — AMIODARONE HYDROCHLORIDE 618 MILLIGRAM(S): 400 TABLET ORAL at 09:46

## 2019-04-04 RX ADMIN — AMIODARONE HYDROCHLORIDE 33.33 MG/MIN: 400 TABLET ORAL at 10:07

## 2019-04-04 RX ADMIN — SODIUM CHLORIDE 100 MILLILITER(S): 9 INJECTION, SOLUTION INTRAVENOUS at 12:37

## 2019-04-04 RX ADMIN — Medication 50 MILLIGRAM(S): at 21:16

## 2019-04-04 RX ADMIN — SODIUM CHLORIDE 100 MILLILITER(S): 9 INJECTION, SOLUTION INTRAVENOUS at 19:41

## 2019-04-04 RX ADMIN — PANTOPRAZOLE SODIUM 40 MILLIGRAM(S): 20 TABLET, DELAYED RELEASE ORAL at 12:36

## 2019-04-04 RX ADMIN — AMIODARONE HYDROCHLORIDE 33.33 MG/MIN: 400 TABLET ORAL at 10:08

## 2019-04-04 RX ADMIN — HEPARIN SODIUM 5000 UNIT(S): 5000 INJECTION INTRAVENOUS; SUBCUTANEOUS at 14:02

## 2019-04-04 NOTE — PROGRESS NOTE ADULT - ASSESSMENT
Impression:  Sepsis/ septic shock  Urosepsis/ PNA  LEE/ hyperkalemia  Metastatic disease. ? etiology   A fib.     IMPRESSION:      PLAN:     CNS: NO sedation.     HEENT: Oral care.    PULMONARY:  HOB @ 45 degrees. Meropenem for now.     CARDIOVASCULAR: Keep in D50 @100cc/hr. Rate control with amiodarone     GI: GI prophylaxis.  Feeding as tolerated. Speech and swallow eval.    RENAL:  Follow up lytes.  Correct as needed.    INFECTIOUS DISEASE: Follow up cultures. Continue with meropenem for now. Blood culture.     HEMATOLOGICAL:  DVT prophylaxis. Out patient heme onc work up for malignancy . Hydrocortisone 50 Q8.    ENDOCRINE:  Follow up FS.  Insulin protocol if needed.    MUSCULOSKELETAL:    ICU.     Prognosis grave. Impression:  Sepsis/ septic shock  Urosepsis/ PNA  LEE/ hyperkalemia  Metastatic disease. ? etiology   A fib.   hypernatremia    IMPRESSION:      PLAN:     CNS: NO sedation.     HEENT: Oral care.    PULMONARY:  HOB @ 45 degrees. Meropenem for now.     CARDIOVASCULAR: Keep in D50 @100cc/hr. Rate control with amiodarone     GI: GI prophylaxis.  Feeding as tolerated. Speech and swallow eval.    RENAL:  Follow up lytes.  Correct as needed. Free H2O    INFECTIOUS DISEASE: Follow up cultures. Continue with meropenem for now. Blood culture.     HEMATOLOGICAL:  DVT prophylaxis. Out patient heme onc work up for malignancy . Hydrocortisone 50 Q8.    ENDOCRINE:  Follow up FS.  Insulin protocol if needed.    MUSCULOSKELETAL:    ICU.     Prognosis grave.

## 2019-04-04 NOTE — DIETITIAN INITIAL EVALUATION ADULT. - FACTORS AFF FOOD INTAKE
Pt was seen by SLP on 4/3 and they recommend NPO w/ alternate means of nutrition. Pending plan of care. CMO?

## 2019-04-04 NOTE — DIETITIAN INITIAL EVALUATION ADULT. - ENERGY NEEDS
Calories: 2175-2538kcals/day (30-35kcals/kg)   Protein: 94-109g/day (1.3-1.5g/kg)   Fluid: 1:1ml/kcal or fluid as per LIP

## 2019-04-04 NOTE — PROGRESS NOTE ADULT - SUBJECTIVE AND OBJECTIVE BOX
Patient is a 79y old  Male who presents with a chief complaint of altered mental status (04 Apr 2019 05:20)      T(F): 96.3 (04-04-19 @ 07:10), Max: 97 (04-03-19 @ 11:00)  HR: 126 (04-04-19 @ 05:41)  BP: 117/62 (04-04-19 @ 05:41)  RR: 18 (04-04-19 @ 07:10)  SpO2: 99% (04-04-19 @ 05:41) (86% - 100%)    PHYSICAL EXAM:  GENERAL: NAD, well-groomed, well-developed  HEAD:  Atraumatic, Normocephalic  EYES: EOMI, PERRLA, conjunctiva and sclera clear  ENMT: No tonsillar erythema, exudates, or enlargement; Moist mucous membranes, Good dentition, No lesions  NECK: Supple, No JVD, Normal thyroid  NERVOUS SYSTEM:  Alert & Oriented X3,  Motor Strength 5/5 B/L upper and lower extremities  CHEST/LUNG: Clear to percussion bilaterally; No rales, rhonchi, wheezing, or rubs  HEART: Regular rate and rhythm; No murmurs, rubs, or gallops  ABDOMEN: Soft, Nontender, Nondistended; Bowel sounds present  EXTREMITIES:   No clubbing, cyanosis, or edema  LYMPH: No lymphadenopathy noted  SKIN: No rashes or lesions    labs  04-03    155<H>  |  122<H>  |  107<HH>  ----------------------------<  146<H>  4.5   |  20  |  3.4<H>    Ca    8.0<L>      03 Apr 2019 10:54    TPro  4.8<L>  /  Alb  2.3<L>  /  TBili  0.4  /  DBili  x   /  AST  13  /  ALT  16  /  AlkPhos  135<H>  04-03                          12.3   12.92 )-----------( 122      ( 03 Apr 2019 06:56 )             38.7       Culture - Urine (collected 02 Apr 2019 20:07)  Source: .Urine Catheterized  Final Report (04 Apr 2019 01:17):    No growth    Culture - Blood (collected 02 Apr 2019 19:25)  Source: .Blood None  Preliminary Report (04 Apr 2019 03:01):    No growth to date.    Culture - Blood (collected 02 Apr 2019 13:07)  Source: .Blood Blood-Peripheral  Preliminary Report (03 Apr 2019 23:01):    No growth to date.    Culture - Blood (collected 02 Apr 2019 13:07)  Source: .Blood Blood-Peripheral  Preliminary Report (03 Apr 2019 23:01):    No growth to date.      PT/INR - ( 02 Apr 2019 13:05 )   PT: 14.50 sec;   INR: 1.26 ratio         PTT - ( 02 Apr 2019 13:05 )  PTT:25.4 sec    Creatine Kinase, Serum: 59 U/L (04-03-19 @ 10:54)  Troponin T, Serum: 0.03 ng/mL <HH> (04-03-19 @ 10:54)      amiodarone Infusion 0.5 mG/Min IV Continuous <Continuous>  amiodarone Infusion 1 mG/Min IV Continuous <Continuous>  aspirin  chewable 81 milliGRAM(s) Oral daily  atorvastatin 20 milliGRAM(s) Oral at bedtime  dextrose 5%. 1000 milliLiter(s) IV Continuous <Continuous>  esMOLOL  Infusion 50 MICROgram(s)/kG/Min IV Continuous <Continuous>  heparin  Injectable 5000 Unit(s) SubCutaneous every 8 hours  hydrocortisone sodium succinate Injectable 50 milliGRAM(s) IV Push every 8 hours  levothyroxine Injectable 25 MICROGram(s) IV Push at bedtime  meropenem  IVPB 500 milliGRAM(s) IV Intermittent every 24 hours  morphine  - Injectable 4 milliGRAM(s) IV Push every 2 hours PRN  pantoprazole  Injectable 40 milliGRAM(s) IV Push daily  sodium chloride 0.9% lock flush 10 milliLiter(s) IV Push every 8 hours PRN  tamsulosin 0.4 milliGRAM(s) Oral at bedtime

## 2019-04-04 NOTE — DIETITIAN INITIAL EVALUATION ADULT. - OTHER INFO
Reason for assessment: PU. PMH: BPH, CHF, GOUT, high cholesterol, hyperkalemia, hypothyroid, PVD. Pt presented to ED for AMS. In the ED he was found to be hypotensive + LEE w/ acute urinary retention. CKD lll, metastasis, DM, CHF, afib, and hypernatremia (155) noted. Sepsis and metabolic acidosis noted as resolved. Pts brother reports a wt loss but he can not provide a timeframe or amount. No past admits w/ wt documented. Unable to confirm significant wt loss at this time. Brother reports that he will most likely have issues chewing food because he does not have his dentures w/ him. Unsure of last BM but RN reports that he did not have one today. Abdomen noted as soft, nontender, nondistended +BS. NKFA.

## 2019-04-04 NOTE — PROGRESS NOTE ADULT - SUBJECTIVE AND OBJECTIVE BOX
infectious diseases progress note:  LUKE HAM is a 79yMale patient    HYPOTENSION,UNSPECIFIED HYPOTENSION TYPE;ACUTE RENAL FAILURE,UNSPECIFIED    Slurred speech  Weight loss  CHF (congestive heart failure)  Hypothyroid  Diabetes mellitus, type 2  Hypotension, unspecified hypotension type  Acute renal failure, unspecified acute renal failure type      ROS:  UTO   Allergies    No Known Allergies    Intolerances        ANTIBIOTICS/RELEVANT:  antimicrobials  meropenem  IVPB 500 milliGRAM(s) IV Intermittent every 24 hours    immunologic:    OTHER:  amiodarone Infusion 0.5 mG/Min IV Continuous <Continuous>  amiodarone Infusion 1 mG/Min IV Continuous <Continuous>  aspirin  chewable 81 milliGRAM(s) Oral daily  atorvastatin 20 milliGRAM(s) Oral at bedtime  dextrose 5%. 1000 milliLiter(s) IV Continuous <Continuous>  esMOLOL  Infusion 50 MICROgram(s)/kG/Min IV Continuous <Continuous>  heparin  Injectable 5000 Unit(s) SubCutaneous every 8 hours  hydrocortisone sodium succinate Injectable 50 milliGRAM(s) IV Push every 8 hours  levothyroxine Injectable 25 MICROGram(s) IV Push at bedtime  morphine  - Injectable 4 milliGRAM(s) IV Push every 2 hours PRN  pantoprazole  Injectable 40 milliGRAM(s) IV Push daily  sodium chloride 0.9% lock flush 10 milliLiter(s) IV Push every 8 hours PRN  tamsulosin 0.4 milliGRAM(s) Oral at bedtime      Objective:  T(F): 96.6 (19 @ 23:37), Max: 97 (19 @ 11:00)  HR: 124 (19 @ 04:08) (92 - 134)  BP: 97/68 (19 @ 04:08) (72/54 - 124/66)  RR: 25 (19 @ 04:08) (15 - 50)  SpO2: 99% (19 @ 04:08) (86% - 100%)    PHYSICAL EXAM:  Constitutional:Well-developed, well nourished  comfortable	  Neck:no JVD, no lymphadenopathy, supple  Respiratory: CTA torie  Cardiovascular: S1S2+  Gastrointestinal:soft, (+) BS, no HSM  Extremities:no phlebitis        LABS:                        12.3   12.92 )-----------( 122      ( 2019 06:56 )             38.7     04-03    155<H>  |  122<H>  |  107<HH>  ----------------------------<  146<H>  4.5   |  20  |  3.4<H>    Ca    8.0<L>      2019 10:54    TPro  4.8<L>  /  Alb  2.3<L>  /  TBili  0.4  /  DBili  x   /  AST  13  /  ALT  16  /  AlkPhos  135<H>      PT/INR - ( 2019 13:05 )   PT: 14.50 sec;   INR: 1.26 ratio         PTT - ( 2019 13:05 )  PTT:25.4 sec  Urinalysis Basic - ( 2019 14:24 )    Color: Yellow / Appearance: Clear / S.015 / pH: x  Gluc: x / Ketone: Negative  / Bili: Negative / Urobili: 0.2 mg/dL   Blood: x / Protein: Trace mg/dL / Nitrite: Negative   Leuk Esterase: Moderate / RBC: 6-10 /HPF / WBC 26-50 /HPF   Sq Epi: x / Non Sq Epi: x / Bacteria: x          MICROBIOLOGY:    Culture - Urine (collected 19 @ 20:07)  Source: .Urine Catheterized  Final Report (19 @ 01:17):    No growth    Culture - Blood (collected 19 @ 19:25)  Source: .Blood None  Preliminary Report (19 @ 03:01):    No growth to date.    Culture - Blood (collected 19 @ 13:07)  Source: .Blood Blood-Peripheral  Preliminary Report (19 @ 23:01):    No growth to date.    Culture - Blood (collected 19 @ 13:07)  Source: .Blood Blood-Peripheral  Preliminary Report (19 @ 23:01):    No growth to date.        Culture - Urine (collected 2019 20:07)  Source: .Urine Catheterized  Final Report (2019 01:17):    No growth    Culture - Blood (collected 2019 19:25)  Source: .Blood None  Preliminary Report (2019 03:01):    No growth to date.    Culture - Blood (collected 2019 13:07)  Source: .Blood Blood-Peripheral  Preliminary Report (2019 23:01):    No growth to date.    Culture - Blood (collected 2019 13:07)  Source: .Blood Blood-Peripheral  Preliminary Report (2019 23:01):    No growth to date.      Culture Results:   No growth ( @ 20:07)  Culture Results:   No growth to date. ( @ 19:25)  Culture Results:   No growth to date. ( @ 13:07)  Culture Results:   No growth to date. (- @ 13:07)        RADIOLOGY & ADDITIONAL STUDIES:

## 2019-04-04 NOTE — PROGRESS NOTE ADULT - SUBJECTIVE AND OBJECTIVE BOX
Patient is more awake and talkative today as is his baseline      T(F): 96.3 (04-04-19 @ 07:10), Max: 96.6 (04-03-19 @ 23:37)  HR: 112 (04-04-19 @ 10:09)  BP: 96/54 (04-04-19 @ 10:09)  RR: 20  SpO2: 91% (04-04-19 @ 10:09) (86% - 100%)    PHYSICAL EXAM:  GENERAL: NAD  HEAD:  Atraumatic, Normocephalic  NERVOUS SYSTEM:  no focal deficits  CHEST/LUNG: bilateral rhonchi  HEART: Regular rate and rhythm; No murmurs, rubs, or gallops  ABDOMEN: Soft, Nontender, Nondistended; Bowel sounds present  EXTREMITIES:  2+ Peripheral Pulses, No clubbing, cyanosis, or edema  LYMPH: No lymphadenopathy noted  SKIN: No rashes or lesions    LABS  04-04    153<H>  |  123<H>  |  91<HH>  ----------------------------<  249<H>  4.3   |  22  |  2.8<H>    Ca    8.6      04 Apr 2019 13:08  Mg     2.1     04-04    TPro  4.9<L>  /  Alb  2.2<L>  /  TBili  0.4  /  DBili  x   /  AST  15  /  ALT  16  /  AlkPhos  150<H>  04-04                          11.4   10.23 )-----------( 91       ( 04 Apr 2019 13:08 )             35.8         CARDIAC ENZYMES  Creatine Kinase, Serum: 59 (04-03 @ 10:54)    CKMB Units: 5.0 (04-03 @ 10:54)    Troponin T, Serum: 0.03 ng/mL (04-03-19 @ 10:54)  Troponin T, Serum: 0.05 ng/mL (04-03-19 @ 06:56)  Troponin T, Serum: 0.03 ng/mL (04-02-19 @ 19:25)  Troponin T, Serum: 0.05 ng/mL (04-02-19 @ 13:05)      Culture Results:   No growth (04-02-19)  Culture Results:   No growth to date. (04-02-19)  Culture Results:   No growth to date. (04-02-19)  Culture Results:   No growth to date. (04-02-19)    RADIOLOGY  < from: Xray Chest 1 View- PORTABLE-Urgent (04.03.19 @ 09:19) >  Impression:      Stable bibasilar opacities. No pleural effusion or pneumothorax.   Poststernotomy.    < end of copied text >    MEDICATIONS  (STANDING):  amiodarone Infusion 1 mG/Min (33.333 mL/Hr) IV Continuous <Continuous>  amiodarone Infusion 0.5 mG/Min (16.667 mL/Hr) IV Continuous <Continuous>  aspirin  chewable 81 milliGRAM(s) Oral daily  atorvastatin 20 milliGRAM(s) Oral at bedtime  dextrose 5%. 1000 milliLiter(s) (100 mL/Hr) IV Continuous <Continuous>  heparin  Injectable 5000 Unit(s) SubCutaneous every 8 hours  hydrocortisone sodium succinate Injectable 50 milliGRAM(s) IV Push every 8 hours  levothyroxine Injectable 25 MICROGram(s) IV Push at bedtime  meropenem  IVPB 500 milliGRAM(s) IV Intermittent every 24 hours  pantoprazole  Injectable 40 milliGRAM(s) IV Push daily  tamsulosin 0.4 milliGRAM(s) Oral at bedtime    MEDICATIONS  (PRN):  morphine  - Injectable 4 milliGRAM(s) IV Push every 2 hours PRN pain, dyspnea  sodium chloride 0.9% lock flush 10 milliLiter(s) IV Push every 8 hours PRN Pre/post blood products, medications, blood draw, and to maintain line patency

## 2019-04-04 NOTE — PROGRESS NOTE ADULT - PROBLEM SELECTOR PLAN 1
? UTI   Urine cx - neg to date   Continue IV abx  Likely 7 days of abx   ambulate   less likely  pneumonia

## 2019-04-04 NOTE — PROGRESS NOTE ADULT - ASSESSMENT
Patient more alert. Non verbal . Afib rate increased. Family wanted amiodarone Iv stopped. Wanted no drips. Now DNR/DNI. Consider amiodarone po or beta for rate control. If family agrres . Prognosis poor.

## 2019-04-04 NOTE — PROGRESS NOTE ADULT - SUBJECTIVE AND OBJECTIVE BOX
Patient is a 79y old  Male who presents with a chief complaint of altered mental status (2019 08:10)        Over Night Events:        ROS:  See HPI    PHYSICAL EXAM    ICU Vital Signs Last 24 Hrs  T(C): 35.7 (2019 07:10), Max: 36.1 (2019 11:00)  T(F): 96.3 (2019 07:10), Max: 97 (2019 11:00)  HR: 126 (2019 05:41) (100 - 128)  BP: 117/62 (2019 05:41) (72/54 - 117/62)  BP(mean): 83 (2019 05:41) (58 - 83)  ABP: --  ABP(mean): --  RR: 18 (2019 07:10) (15 - 39)  SpO2: 99% (2019 05:41) (86% - 100%)      General:  HEENT: BLANQUITA             Lymphatic system: No cervical LN   Lungs: Bilateral BS  Cardiovascular: Regular   Gastrointestinal: Soft, Positive BS  Extremities: No clubbing.  Moves extremities.  Full Range of motion   Skin: Warm, intact  Neurological: No motor or sensory deficit       19 @ 07:01  -  19 @ 07:00  --------------------------------------------------------  IN:    amiodarone Infusion: 266.4 mL    dextrose 5%.: 550 mL    diltiazem Infusion: 20 mL    esmolol Infusion: 108.5 mL    norepinephrine Infusion: 15 mL    phenylephrine   Infusion: 88.3 mL  Total IN: 1048.2 mL    OUT:    Indwelling Catheter - Urethral: 1825 mL    Other: 100 mL  Total OUT: 1925 mL    Total NET: -876.8 mL          LABS:                            12.3   12.92 )-----------( 122      ( 2019 06:56 )             38.7                                                   155<H>  |  122<H>  |  107<HH>  ----------------------------<  146<H>  4.5   |  20  |  3.4<H>    Ca    8.0<L>      2019 10:54    TPro  4.8<L>  /  Alb  2.3<L>  /  TBili  0.4  /  DBili  x   /  AST  13  /  ALT  16  /  AlkPhos  135<H>  04-03      PT/INR - ( 2019 13:05 )   PT: 14.50 sec;   INR: 1.26 ratio         PTT - ( 2019 13:05 )  PTT:25.4 sec                                       Urinalysis Basic - ( 2019 14:24 )    Color: Yellow / Appearance: Clear / S.015 / pH: x  Gluc: x / Ketone: Negative  / Bili: Negative / Urobili: 0.2 mg/dL   Blood: x / Protein: Trace mg/dL / Nitrite: Negative   Leuk Esterase: Moderate / RBC: 6-10 /HPF / WBC 26-50 /HPF   Sq Epi: x / Non Sq Epi: x / Bacteria: x        CARDIAC MARKERS ( 2019 10:54 )  x     / 0.03 ng/mL / 59 U/L / x     / 5.0 ng/mL  CARDIAC MARKERS ( 2019 06:56 )  x     / 0.05 ng/mL / x     / x     / x      CARDIAC MARKERS ( 2019 19:25 )  x     / 0.03 ng/mL / x     / x     / x      CARDIAC MARKERS ( 2019 13:05 )  x     / 0.05 ng/mL / x     / x     / x                                                LIVER FUNCTIONS - ( 2019 10:54 )  Alb: 2.3 g/dL / Pro: 4.8 g/dL / ALK PHOS: 135 U/L / ALT: 16 U/L / AST: 13 U/L / GGT: x                                                  Culture - Urine (collected 2019 20:07)  Source: .Urine Catheterized  Final Report (2019 01:17):    No growth    Culture - Blood (collected 2019 19:25)  Source: .Blood None  Preliminary Report (2019 03:01):    No growth to date.    Culture - Blood (collected 2019 13:07)  Source: .Blood Blood-Peripheral  Preliminary Report (2019 23:01):    No growth to date.    Culture - Blood (collected 2019 13:07)  Source: .Blood Blood-Peripheral  Preliminary Report (2019 23:01):    No growth to date.                                          MEDICATIONS  (STANDING):  amiodarone Infusion 0.5 mG/Min (16.667 mL/Hr) IV Continuous <Continuous>  amiodarone Infusion 1 mG/Min (33.333 mL/Hr) IV Continuous <Continuous>  aspirin  chewable 81 milliGRAM(s) Oral daily  atorvastatin 20 milliGRAM(s) Oral at bedtime  dextrose 5%. 1000 milliLiter(s) (50 mL/Hr) IV Continuous <Continuous>  esMOLOL  Infusion 50 MICROgram(s)/kG/Min (21.75 mL/Hr) IV Continuous <Continuous>  heparin  Injectable 5000 Unit(s) SubCutaneous every 8 hours  hydrocortisone sodium succinate Injectable 50 milliGRAM(s) IV Push every 8 hours  levothyroxine Injectable 25 MICROGram(s) IV Push at bedtime  meropenem  IVPB 500 milliGRAM(s) IV Intermittent every 24 hours  pantoprazole  Injectable 40 milliGRAM(s) IV Push daily  tamsulosin 0.4 milliGRAM(s) Oral at bedtime    MEDICATIONS  (PRN):  morphine  - Injectable 4 milliGRAM(s) IV Push every 2 hours PRN pain, dyspnea  sodium chloride 0.9% lock flush 10 milliLiter(s) IV Push every 8 hours PRN Pre/post blood products, medications, blood draw, and to maintain line patency      Xrays:                                                                                     ECHO

## 2019-04-04 NOTE — PROGRESS NOTE ADULT - SUBJECTIVE AND OBJECTIVE BOX
80 y/o M from [facility], PMHx DMII (on metformin), hypothyroidism, CHF presenting for AMS x2 weeks, slurred speech xcouple days, and dramatic weight loss over the past 3 months. In th Ed pt was hypotensive, had CT angio w/ IV contrast done which showed no dissection but metastatic dx to bone w/ unclear primary. Bloodwork then showed LEE with Cr to 5.1, hyperkalemia to 6.9. Anderson placed and over 1 liter cloudy urine came out. Family was informed of concern for malignancy and pt's frailty, niece spoke with Dr Young and decided to make him DNR/DNI. She came in 4/3 and made him comfort measures, no pressors support, but was ok with giving IV fluids and abx. This am pt is off abx and is actually maintaining his own bp and states he is "alright".    Labs:  CARDIAC MARKERS ( 2019 10:54 )  x     / 0.03 ng/mL / 59 U/L / x     / 5.0 ng/mL  CARDIAC MARKERS ( 2019 06:56 )  x     / 0.05 ng/mL / x     / x     / x      CARDIAC MARKERS ( 2019 19:25 )  x     / 0.03 ng/mL / x     / x     / x      CARDIAC MARKERS ( 2019 13:05 )  x     / 0.05 ng/mL / x     / x     / x                            12.3   12.92 )-----------( 122      ( 2019 06:56 )             38.7     -  155<H>  |  122<H>  |  107<HH>  ----------------------------<  146<H>  4.5   |  20  |  3.4<H>  Ca    8.0<L>      2019 10:54    TPro  4.8<L>  /  Alb  2.3<L>  /  TBili  0.4  /  DBili  x   /  AST  13  /  ALT  16  /  AlkPhos  135<H>  -    POCT Blood Glucose.: 140 mg/dL (2019 07:58)  POCT Blood Glucose.: 145 mg/dL (2019 12:10)    LIVER FUNCTIONS - ( 2019 10:54 )  Alb: 2.3 g/dL / Pro: 4.8 g/dL / ALK PHOS: 135 U/L / ALT: 16 U/L / AST: 13 U/L / GGT: x           PT/INR - ( 2019 13:05 )   PT: 14.50 sec;   INR: 1.26 ratio     PTT - ( 2019 13:05 )  PTT:25.4 sec    Urinalysis Basic - ( 2019 14:24 )  Color: Yellow / Appearance: Clear / S.015 / pH: x  Gluc: x / Ketone: Negative  / Bili: Negative / Urobili: 0.2 mg/dL   Blood: x / Protein: Trace mg/dL / Nitrite: Negative   Leuk Esterase: Moderate / RBC: 6-10 /HPF / WBC 26-50 /HPF   Sq Epi: x / Non Sq Epi: x / Bacteria: x    I&O's Summary    2019 07:01  -  2019 07:00  --------------------------------------------------------  IN: 1048.2 mL / OUT: 1925 mL / NET: -876.8 mL      .Urine Catheterized  19   No growth  --  --    .Blood None  19   No growth to date.  --  --    .Blood Blood-Peripheral  19   No growth to date.  --  --    Physical Exam:  Cardiac: uncontrolled afib  Lungs: +significant upper airway secretions  Abd: NTND, +BS  LE: no swelling  Neuro: AAOx2, nonfocal  Access: RIJ central line

## 2019-04-04 NOTE — DIETITIAN INITIAL EVALUATION ADULT. - NUTRITIONGOAL OUTCOME1
Goals will be dependent on plan of care Goals will be dependent on plan of care. Follow-up in 4 days.

## 2019-04-04 NOTE — PROGRESS NOTE ADULT - ASSESSMENT
#Acute renal failure, severe metabolic acidosis and hyperkalemia, AMS 2/2 Renal failure?  could be 2/2 bph, keep sanchez in place for now, pt made 1.5L of urine in 24hours  hold metformin  Renal following  will trend cmp routinely    #Hypotensive was on pressor support, now off at request of niece  source of possible infection is the urine +/- lungs (2/2 asp)   blood and urine cx prelim neg so far  currently on meropenem  needs speech and swallow eval to check for asp    #New onset afib  trend CE, check echo  Cardio c/s-c/w amio in light of niece not wanting iv meds    #Metastatic Dx to bones, unclear primary  chest does not appear to be the source, could be the lumen of the GI tract vs prostate  pt comfort care for now as per niece    #Hypernatremia  will see if niece is ok with restarting D5    #DMII  check FS, npo for now until Speech and swallow eval    #Hypothyroid  c/w synthroid      #CHF  check 2DECHO    #DVT/GI PPX    #Goals of care: DNR/DNI, MOLST form filled out w/ niece (surrogate)

## 2019-04-04 NOTE — PROGRESS NOTE ADULT - PROBLEM SELECTOR PLAN 1
resolving with good urine output  resolved metabolic acidosis and resolved hyperkalemia  bicarb and metformin dc'd  sepsis resolved now off pressors, would taper steroids  urine and blood cultures negative thus far  nephrology following  continue to follow chemistries  DVT prophylaxis

## 2019-04-04 NOTE — PROGRESS NOTE ADULT - ASSESSMENT
79/M with LEE on CKD 3 (creat was reportedly 1.7 mg% as per PMD), DM, HTN, AAA repair, admitted with change in SM found to be in LEE.    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

## 2019-04-04 NOTE — PROGRESS NOTE ADULT - SUBJECTIVE AND OBJECTIVE BOX
Nephrology progress note    Patient is seen and examined, events over the last 24 h noted .  Feeling thirsty. Failed speech and swallow. Off IVF as per sisiter request.  Allergies:  No Known Allergies    Hospital Medications:   MEDICATIONS  (STANDING):  amiodarone Infusion 1 mG/Min (33.333 mL/Hr) IV Continuous <Continuous>  amiodarone Infusion 0.5 mG/Min (16.667 mL/Hr) IV Continuous <Continuous>  amiodarone IVPB 150 milliGRAM(s) IV Intermittent once  aspirin  chewable 81 milliGRAM(s) Oral daily  atorvastatin 20 milliGRAM(s) Oral at bedtime  dextrose 5%. 1000 milliLiter(s) (50 mL/Hr) IV Continuous <Continuous>  heparin  Injectable 5000 Unit(s) SubCutaneous every 8 hours  hydrocortisone sodium succinate Injectable 50 milliGRAM(s) IV Push every 8 hours  levothyroxine Injectable 25 MICROGram(s) IV Push at bedtime  meropenem  IVPB 500 milliGRAM(s) IV Intermittent every 24 hours  pantoprazole  Injectable 40 milliGRAM(s) IV Push daily  tamsulosin 0.4 milliGRAM(s) Oral at bedtime        VITALS:  T(F): 96.3 (19 @ 07:10), Max: 97 (19 @ 11:00)  HR: 126 (19 @ 05:41)  BP: 117/62 (19 @ 05:41)  RR: 18 (19 @ 07:10)  SpO2: 99% (19 @ 05:41)  Wt(kg): --    :  -   @ 07:00  --------------------------------------------------------  IN: 2700.7 mL / OUT: 3365 mL / NET: -664.3 mL     @ 07:01  -   @ 07:00  --------------------------------------------------------  IN: 1048.2 mL / OUT: 1925 mL / NET: -876.8 mL          PHYSICAL EXAM:  Constitutional: NAD  HEENT: anicteric sclera, very dry  Neck: No JVD  Respiratory: CTAB, no wheezes, rales or rhonchi  Cardiovascular: S1, S2, RRR  Gastrointestinal: BS+, soft, NT/ND  Extremities:  No peripheral edema  Neurological: Awake alert  :  sanchez+.   Skin: No rashes  Vascular Access:    LABS:      155<H>  |  122<H>  |  107<HH>  ----------------------------<  146<H>  4.5   |  20  |  3.4<H>  Creatinine Trend: 3.4<--, 3.5<--, 4.2<--, 4.5<--, 5.1<--    Ca    8.0<L>      2019 10:54    TPro  4.8<L>  /  Alb  2.3<L>  /  TBili  0.4  /  DBili      /  AST  13  /  ALT  16  /  AlkPhos  135<H>                            12.3   12.92 )-----------( 122      ( 2019 06:56 )             38.7       Urine Studies:  Urinalysis Basic - ( 2019 14:24 )    Color: Yellow / Appearance: Clear / S.015 / pH:   Gluc:  / Ketone: Negative  / Bili: Negative / Urobili: 0.2 mg/dL   Blood:  / Protein: Trace mg/dL / Nitrite: Negative   Leuk Esterase: Moderate / RBC: 6-10 /HPF / WBC 26-50 /HPF   Sq Epi:  / Non Sq Epi:  / Bacteria:       Osmolality, Random Urine: 267 mos/kg ( @ 12:53)  Sodium, Random Urine: 23.0 mmoL/L ( @ 12:53)  Protein/Creatinine Ratio Calculation: 0.7 Ratio ( @ 12:53)  Creatinine, Random Urine: 32 mg/dL ( @ 12:53)    RADIOLOGY & ADDITIONAL STUDIES:  < from: Xray Chest 1 View- PORTABLE-Urgent (19 @ 09:19) >  Stable bibasilar opacities. No pleural effusion or pneumothorax.   Poststernotomy.    < end of copied text >

## 2019-04-05 DIAGNOSIS — N39.0 URINARY TRACT INFECTION, SITE NOT SPECIFIED: ICD-10-CM

## 2019-04-05 LAB
ALBUMIN SERPL ELPH-MCNC: 2.4 G/DL — LOW (ref 3.5–5.2)
ALP SERPL-CCNC: 150 U/L — HIGH (ref 30–115)
ALT FLD-CCNC: 16 U/L — SIGNIFICANT CHANGE UP (ref 0–41)
ANION GAP SERPL CALC-SCNC: 10 MMOL/L — SIGNIFICANT CHANGE UP (ref 7–14)
ANION GAP SERPL CALC-SCNC: 12 MMOL/L — SIGNIFICANT CHANGE UP (ref 7–14)
AST SERPL-CCNC: 14 U/L — SIGNIFICANT CHANGE UP (ref 0–41)
BASOPHILS # BLD AUTO: 0.02 K/UL — SIGNIFICANT CHANGE UP (ref 0–0.2)
BASOPHILS NFR BLD AUTO: 0.2 % — SIGNIFICANT CHANGE UP (ref 0–1)
BILIRUB SERPL-MCNC: 0.4 MG/DL — SIGNIFICANT CHANGE UP (ref 0.2–1.2)
BUN SERPL-MCNC: 82 MG/DL — CRITICAL HIGH (ref 10–20)
BUN SERPL-MCNC: 88 MG/DL — CRITICAL HIGH (ref 10–20)
CALCIUM SERPL-MCNC: 8.4 MG/DL — LOW (ref 8.5–10.1)
CALCIUM SERPL-MCNC: 8.6 MG/DL — SIGNIFICANT CHANGE UP (ref 8.5–10.1)
CHLORIDE SERPL-SCNC: 115 MMOL/L — HIGH (ref 98–110)
CHLORIDE SERPL-SCNC: 119 MMOL/L — HIGH (ref 98–110)
CO2 SERPL-SCNC: 22 MMOL/L — SIGNIFICANT CHANGE UP (ref 17–32)
CO2 SERPL-SCNC: 23 MMOL/L — SIGNIFICANT CHANGE UP (ref 17–32)
CREAT SERPL-MCNC: 2.3 MG/DL — HIGH (ref 0.7–1.5)
CREAT SERPL-MCNC: 2.5 MG/DL — HIGH (ref 0.7–1.5)
EOSINOPHIL # BLD AUTO: 0.01 K/UL — SIGNIFICANT CHANGE UP (ref 0–0.7)
EOSINOPHIL NFR BLD AUTO: 0.1 % — SIGNIFICANT CHANGE UP (ref 0–8)
GLUCOSE BLDC GLUCOMTR-MCNC: 143 MG/DL — HIGH (ref 70–99)
GLUCOSE BLDC GLUCOMTR-MCNC: 144 MG/DL — HIGH (ref 70–99)
GLUCOSE BLDC GLUCOMTR-MCNC: 150 MG/DL — HIGH (ref 70–99)
GLUCOSE BLDC GLUCOMTR-MCNC: 207 MG/DL — HIGH (ref 70–99)
GLUCOSE SERPL-MCNC: 167 MG/DL — HIGH (ref 70–99)
GLUCOSE SERPL-MCNC: 172 MG/DL — HIGH (ref 70–99)
HCT VFR BLD CALC: 34.8 % — LOW (ref 42–52)
HGB BLD-MCNC: 10.8 G/DL — LOW (ref 14–18)
IMM GRANULOCYTES NFR BLD AUTO: 1.2 % — HIGH (ref 0.1–0.3)
LYMPHOCYTES # BLD AUTO: 0.42 K/UL — LOW (ref 1.2–3.4)
LYMPHOCYTES # BLD AUTO: 3.2 % — LOW (ref 20.5–51.1)
MAGNESIUM SERPL-MCNC: 2.1 MG/DL — SIGNIFICANT CHANGE UP (ref 1.8–2.4)
MCHC RBC-ENTMCNC: 29.8 PG — SIGNIFICANT CHANGE UP (ref 27–31)
MCHC RBC-ENTMCNC: 31 G/DL — LOW (ref 32–37)
MCV RBC AUTO: 96.1 FL — HIGH (ref 80–94)
MONOCYTES # BLD AUTO: 0.74 K/UL — HIGH (ref 0.1–0.6)
MONOCYTES NFR BLD AUTO: 5.7 % — SIGNIFICANT CHANGE UP (ref 1.7–9.3)
NEUTROPHILS # BLD AUTO: 11.65 K/UL — HIGH (ref 1.4–6.5)
NEUTROPHILS NFR BLD AUTO: 89.6 % — HIGH (ref 42.2–75.2)
NRBC # BLD: 0 /100 WBCS — SIGNIFICANT CHANGE UP (ref 0–0)
OSMOLALITY SERPL: 345 MOS/KG — HIGH (ref 289–308)
PLATELET # BLD AUTO: 88 K/UL — LOW (ref 130–400)
POTASSIUM SERPL-MCNC: 4.2 MMOL/L — SIGNIFICANT CHANGE UP (ref 3.5–5)
POTASSIUM SERPL-MCNC: 4.5 MMOL/L — SIGNIFICANT CHANGE UP (ref 3.5–5)
POTASSIUM SERPL-SCNC: 4.2 MMOL/L — SIGNIFICANT CHANGE UP (ref 3.5–5)
POTASSIUM SERPL-SCNC: 4.5 MMOL/L — SIGNIFICANT CHANGE UP (ref 3.5–5)
PROT SERPL-MCNC: 5.3 G/DL — LOW (ref 6–8)
RBC # BLD: 3.62 M/UL — LOW (ref 4.7–6.1)
RBC # FLD: 16.5 % — HIGH (ref 11.5–14.5)
SODIUM SERPL-SCNC: 150 MMOL/L — HIGH (ref 135–146)
SODIUM SERPL-SCNC: 151 MMOL/L — HIGH (ref 135–146)
WBC # BLD: 13 K/UL — HIGH (ref 4.8–10.8)
WBC # FLD AUTO: 13 K/UL — HIGH (ref 4.8–10.8)

## 2019-04-05 PROCEDURE — 71045 X-RAY EXAM CHEST 1 VIEW: CPT | Mod: 26

## 2019-04-05 PROCEDURE — 70450 CT HEAD/BRAIN W/O DYE: CPT | Mod: 26

## 2019-04-05 RX ORDER — FENTANYL CITRATE 50 UG/ML
1 INJECTION INTRAVENOUS
Qty: 0 | Refills: 0 | Status: DISCONTINUED | OUTPATIENT
Start: 2019-04-05 | End: 2019-04-09

## 2019-04-05 RX ORDER — AMIODARONE HYDROCHLORIDE 400 MG/1
200 TABLET ORAL DAILY
Qty: 0 | Refills: 0 | Status: DISCONTINUED | OUTPATIENT
Start: 2019-04-05 | End: 2019-04-07

## 2019-04-05 RX ORDER — HYDROCORTISONE 20 MG
50 TABLET ORAL
Qty: 0 | Refills: 0 | Status: DISCONTINUED | OUTPATIENT
Start: 2019-04-05 | End: 2019-04-07

## 2019-04-05 RX ADMIN — Medication 50 MILLIGRAM(S): at 17:23

## 2019-04-05 RX ADMIN — SODIUM CHLORIDE 100 MILLILITER(S): 9 INJECTION, SOLUTION INTRAVENOUS at 07:23

## 2019-04-05 RX ADMIN — Medication 25 MICROGRAM(S): at 21:23

## 2019-04-05 RX ADMIN — FENTANYL CITRATE 1 PATCH: 50 INJECTION INTRAVENOUS at 21:12

## 2019-04-05 RX ADMIN — HEPARIN SODIUM 5000 UNIT(S): 5000 INJECTION INTRAVENOUS; SUBCUTANEOUS at 05:49

## 2019-04-05 RX ADMIN — SODIUM CHLORIDE 100 MILLILITER(S): 9 INJECTION, SOLUTION INTRAVENOUS at 21:25

## 2019-04-05 RX ADMIN — MEROPENEM 100 MILLIGRAM(S): 1 INJECTION INTRAVENOUS at 05:49

## 2019-04-05 RX ADMIN — FENTANYL CITRATE 1 PATCH: 50 INJECTION INTRAVENOUS at 12:10

## 2019-04-05 RX ADMIN — AMIODARONE HYDROCHLORIDE 200 MILLIGRAM(S): 400 TABLET ORAL at 17:23

## 2019-04-05 RX ADMIN — ATORVASTATIN CALCIUM 20 MILLIGRAM(S): 80 TABLET, FILM COATED ORAL at 21:22

## 2019-04-05 RX ADMIN — HEPARIN SODIUM 5000 UNIT(S): 5000 INJECTION INTRAVENOUS; SUBCUTANEOUS at 21:23

## 2019-04-05 RX ADMIN — HEPARIN SODIUM 5000 UNIT(S): 5000 INJECTION INTRAVENOUS; SUBCUTANEOUS at 13:39

## 2019-04-05 RX ADMIN — TAMSULOSIN HYDROCHLORIDE 0.4 MILLIGRAM(S): 0.4 CAPSULE ORAL at 21:22

## 2019-04-05 RX ADMIN — PANTOPRAZOLE SODIUM 40 MILLIGRAM(S): 20 TABLET, DELAYED RELEASE ORAL at 11:14

## 2019-04-05 RX ADMIN — Medication 50 MILLIGRAM(S): at 05:49

## 2019-04-05 NOTE — SWALLOW BEDSIDE ASSESSMENT ADULT - ASR SWALLOW ASPIRATION MONITOR
oral hygiene/change of breathing pattern/position upright (90Y)/pneumonia/throat clearing/upper respiratory infection/gurgly voice/cough/fever

## 2019-04-05 NOTE — SWALLOW BEDSIDE ASSESSMENT ADULT - COMMENTS
patient in bed, facemask o2 and requiring frequent suctioning by RN, discussed with RN Hoda. Patient is not appropriate for PO at this time.
awaiting hospice consult for pallative/comfort measures

## 2019-04-05 NOTE — SWALLOW BEDSIDE ASSESSMENT ADULT - SWALLOW EVAL: DIAGNOSIS
not appropriate for PO diet based on respiratory status.
moderate to severe oral impairment due to anterior loss of bolus on thin and Nectar-thickened liquids ; moderate to severe of pharyngeal dysphagia due to cough post intake on thin and Nectar-thickened liquids ; cognitive status negatively impacting swallowing function

## 2019-04-05 NOTE — SWALLOW BEDSIDE ASSESSMENT ADULT - SLP PERTINENT HISTORY OF CURRENT PROBLEM
Pt adm: AMS confused, in ED found hypotensive, in acute kidney injury and w/ acute urinary retention

## 2019-04-05 NOTE — PROGRESS NOTE ADULT - ASSESSMENT
#Acute renal failure, severe metabolic acidosis and hyperkalemia, AMS 2/2 Renal failure?  could be 2/2 bph, keep sanchez in place for now, pt made 1.5L of urine in 24hours  hold metformin  Renal following  Cr slowly downtrending, pt makes urine  he keeps removing his sanchez, but pt ends up retaining so sanchez was reinserted    #Hypotensive was on pressor support, now off   source of possible infection is the urine +/- lungs (2/2 asp)   blood and urine cx neg so far  currently on meropenem, last day on 4/8  needs speech and swallow eval to check for asp    #New onset afib  switch to amio po  HR better controlled    #Metastatic Dx to bones, unclear primary  chest does not appear to be the source, could be the lumen of the GI tract vs prostate  will do ct head to check for stroke vs mets to assess ams on presentation  pt comfort care for now as per niece  hospice c/s pending    #Hypernatremia  c/w D5 and trend BMP    #DMII  check FS, npo for now until Speech and swallow eval    #Hypothyroid  c/w synthroid      #CHF  check 2DECHO    #DVT/GI PPX    #Goals of care: DNR/DNI, MOLST form filled out w/ niece (surrogate) #Acute renal failure, severe metabolic acidosis and hyperkalemia, AMS 2/2 Renal failure?  could be 2/2 bph, keep sanchez in place for now, pt made 1.5L of urine in 24hours  hold metformin  Renal following  Cr slowly downtrending, pt makes urine  he keeps removing his sanchez, but pt ends up retaining so sanchez was reinserted  downgrade to medical today    #Hypotensive was on pressor support, now off   source of possible infection is the urine +/- lungs (2/2 asp)   blood and urine cx neg so far  currently on meropenem, last day on 4/8  needs speech and swallow eval to check for asp    #New onset afib  switch to amio po  HR better controlled    #Metastatic Dx to bones, unclear primary  chest does not appear to be the source, could be the lumen of the GI tract vs prostate  will do ct head to check for stroke vs mets to assess ams on presentation  pt comfort care for now as per niece  hospice c/s pending    #Hypernatremia  c/w D5 and trend BMP    #DMII  check FS, npo for now until Speech and swallow eval    #Hypothyroid  c/w synthroid      #CHF  check 2DECHO    #DVT/GI PPX    #Goals of care: DNR/DNI, MOLST form filled out w/ niece (surrogate) #Acute renal failure, severe metabolic acidosis and hyperkalemia, AMS 2/2 Renal failure?  could be 2/2 bph, keep sanchez in place for now, pt made 1.5L of urine in 24hours  hold metformin  Renal following  Cr slowly downtrending, pt makes urine  he keeps removing his sanchez, but pt ends up retaining so sanchez was reinserted  downgrade to medical today    #Hypotensive was on pressor support, now off   source of possible infection is the urine +/- lungs (2/2 asp)   blood and urine cx neg so far  currently on meropenem, last day on 4/8  needs speech and swallow eval to check for asp    #New onset afib  switch to amio po  HR better controlled    #Metastatic Dx to bones, unclear primary  chest does not appear to be the source, could be the lumen of the GI tract vs prostate  will do ct head to check for stroke vs mets to assess ams on presentation  pt comfort care for now as per minda  hospice c/s pending    #Hypernatremia  c/w D5 and trend BMP    #DMII  check FS, npo for now until Speech and swallow eval    #Hypothyroid  c/w synthroid      #CHF  check 2DECHO    #DVT/GI PPX    #Goals of care: DNR/DNI, MOLST form filled out w/ minda (surrogate)  had extensive conversation at 2PM with minda Sanabria over the phone, she is agreeable to placing an ng tube if pt is not under too much distress. She has been notified of his prognosis and informed of the ct head scan.

## 2019-04-05 NOTE — PROGRESS NOTE ADULT - ASSESSMENT
Impression:  Sepsis/ septic shock  Urosepsis/ PNA  LEE/ hyperkalemia  Metastatic disease. ? etiology   A fib.   hypernatremia    IMPRESSION:      PLAN:     CNS: NO sedation.     HEENT: Oral care.    PULMONARY:  HOB @ 45 degrees. Meropenem for now.     CARDIOVASCULAR: Keep in D50 @100cc/hr. Rate control with amiodarone. Convert to PO.    GI: GI prophylaxis.  Feeding as tolerated. Speech and swallow eval.    RENAL:  Follow up lytes.  Correct as needed. Free H2O    INFECTIOUS DISEASE: Follow up cultures. Continue with meropenem for now. Blood culture.     HEMATOLOGICAL:  DVT prophylaxis. Out patient heme onc work up for malignancy . Hydrocortisone 50 Q12.     ENDOCRINE:  Follow up FS.  Insulin protocol if needed.    MUSCULOSKELETAL:    Tele.  Hospice consult.     Prognosis grave.

## 2019-04-05 NOTE — SWALLOW BEDSIDE ASSESSMENT ADULT - SWALLOW EVAL: MANDIBULAR STRENGTH AND MOBILITY
Notified of G-r in blood culture.      Start Zosyn empirically.     Consider DC Tamiflu.        impaired

## 2019-04-05 NOTE — PROGRESS NOTE ADULT - SUBJECTIVE AND OBJECTIVE BOX
Patient is a 79y old  Male who presents with a chief complaint of altered mental status (05 Apr 2019 08:43)        SUBJECTIVE:      REVIEW OF SYSTEMS:  See HPI    PHYSICAL EXAM  Vital Signs Last 24 Hrs  T(C): 35.7 (05 Apr 2019 07:05), Max: 35.7 (05 Apr 2019 05:36)  T(F): 96.3 (05 Apr 2019 07:05), Max: 96.3 (05 Apr 2019 05:36)  HR: 100 (05 Apr 2019 07:11) (88 - 112)  BP: 103/64 (05 Apr 2019 07:11) (96/54 - 107/69)  BP(mean): 76 (05 Apr 2019 07:11) (72 - 84)  RR: 19 (05 Apr 2019 07:11) (16 - 34)  SpO2: 91% (05 Apr 2019 07:11) (91% - 93%)    General: In NAD  HEENT: BLANQUITA               Lymphatic system: No Cervical LN    Respiratory: Minor BS  Cardiovascular: Regular  Gastrointestinal: Soft. + BS  Musculoskeletal: No clubbing.  moves all extremities.  Full range of motion    Skin: Warm.  Intact  Neurological: No motor or sensory deficit      04-04-19 @ 07:01  -  04-05-19 @ 07:00  --------------------------------------------------------  IN:    amiodarone Infusion: 400.3 mL    dextrose 5%.: 1800 mL    IV PiggyBack: 100 mL  Total IN: 2300.3 mL    OUT:    Indwelling Catheter - Urethral: 1195 mL  Total OUT: 1195 mL    Total NET: 1105.3 mL      04-05-19 @ 07:01  -  04-05-19 @ 09:06  --------------------------------------------------------  IN:    amiodarone Infusion: 16.7 mL  Total IN: 16.7 mL    OUT:  Total OUT: 0 mL    Total NET: 16.7 mL          LABS:                          10.8   13.00 )-----------( 88       ( 05 Apr 2019 05:30 )             34.8                                               04-05    151<H>  |  119<H>  |  88<HH>  ----------------------------<  167<H>  4.5   |  22  |  2.5<H>    Ca    8.6      05 Apr 2019 05:30  Mg     2.1     04-05    TPro  5.2<L>  /  Alb  2.4<L>  /  TBili  0.4  /  DBili  x   /  AST  15  /  ALT  16  /  AlkPhos  153<H>  04-04                                                 CARDIAC MARKERS ( 03 Apr 2019 10:54 )  x     / 0.03 ng/mL / 59 U/L / x     / 5.0 ng/mL                                            LIVER FUNCTIONS - ( 04 Apr 2019 19:32 )  Alb: 2.4 g/dL / Pro: 5.2 g/dL / ALK PHOS: 153 U/L / ALT: 16 U/L / AST: 15 U/L / GGT: x                                                  Culture - Blood (collected 03 Apr 2019 10:54)  Source: .Blood None  Preliminary Report (04 Apr 2019 22:00):    No growth to date.    Culture - Urine (collected 02 Apr 2019 20:07)  Source: .Urine Catheterized  Final Report (04 Apr 2019 01:17):    No growth    Culture - Blood (collected 02 Apr 2019 19:25)  Source: .Blood None  Preliminary Report (04 Apr 2019 03:01):    No growth to date.    Culture - Blood (collected 02 Apr 2019 13:07)  Source: .Blood Blood-Peripheral  Preliminary Report (03 Apr 2019 23:01):    No growth to date.    Culture - Blood (collected 02 Apr 2019 13:07)  Source: .Blood Blood-Peripheral  Preliminary Report (03 Apr 2019 23:01):    No growth to date.                                                    MEDICATIONS  (STANDING):  amiodarone    Tablet 200 milliGRAM(s) Oral daily  amiodarone Infusion 1 mG/Min (33.333 mL/Hr) IV Continuous <Continuous>  amiodarone Infusion 0.5 mG/Min (16.667 mL/Hr) IV Continuous <Continuous>  aspirin  chewable 81 milliGRAM(s) Oral daily  atorvastatin 20 milliGRAM(s) Oral at bedtime  dextrose 5%. 1000 milliLiter(s) (100 mL/Hr) IV Continuous <Continuous>  fentaNYL   Patch  12 MICROgram(s)/Hr. 1 Patch Transdermal every 48 hours  heparin  Injectable 5000 Unit(s) SubCutaneous every 8 hours  hydrocortisone sodium succinate Injectable 50 milliGRAM(s) IV Push every 8 hours  levothyroxine Injectable 25 MICROGram(s) IV Push at bedtime  meropenem  IVPB 500 milliGRAM(s) IV Intermittent every 24 hours  pantoprazole  Injectable 40 milliGRAM(s) IV Push daily  tamsulosin 0.4 milliGRAM(s) Oral at bedtime    MEDICATIONS  (PRN):  sodium chloride 0.9% lock flush 10 milliLiter(s) IV Push every 8 hours PRN Pre/post blood products, medications, blood draw, and to maintain line patency

## 2019-04-05 NOTE — PROGRESS NOTE ADULT - SUBJECTIVE AND OBJECTIVE BOX
Patient is a 79y old  Male who presents with a chief complaint of altered mental status (05 Apr 2019 05:39)      T(F): 96.3 (04-05-19 @ 07:05), Max: 96.3 (04-05-19 @ 05:36)  HR: 97 (04-05-19 @ 05:36)  BP: 107/69 (04-05-19 @ 05:36)  RR: 16 (04-05-19 @ 07:05)  SpO2: 93% (04-05-19 @ 05:36) (89% - 93%)    PHYSICAL EXAM:  GENERAL: NAD, well-groomed, well-developed  HEAD:  Atraumatic, Normocephalic  EYES: EOMI, PERRLA, conjunctiva and sclera clear  ENMT: No tonsillar erythema, exudates, or enlargement; Moist mucous membranes, Good dentition, No lesions  NECK: Supple, No JVD, Normal thyroid  NERVOUS SYSTEM:  Alert & Oriented X3,  Motor Strength 5/5 B/L upper and lower extremities  CHEST/LUNG: Clear to percussion bilaterally; No rales, rhonchi, wheezing, or rubs  HEART: Irreg No murmurs, rubs, or gallops  ABDOMEN: Soft, Nontender, Nondistended; Bowel sounds present  EXTREMITIES:   No clubbing, cyanosis, or edema  LYMPH: No lymphadenopathy noted  SKIN: No rashes or lesions    labs  04-04    153<H>  |  120<H>  |  87<HH>  ----------------------------<  179<H>  4.2   |  21  |  2.5<H>    Ca    8.8      04 Apr 2019 19:32  Mg     2.1     04-04    TPro  5.2<L>  /  Alb  2.4<L>  /  TBili  0.4  /  DBili  x   /  AST  15  /  ALT  16  /  AlkPhos  153<H>  04-04                          10.8   13.00 )-----------( 88       ( 05 Apr 2019 05:30 )             34.8       Culture - Blood (collected 03 Apr 2019 10:54)  Source: .Blood None  Preliminary Report (04 Apr 2019 22:00):    No growth to date.    Culture - Urine (collected 02 Apr 2019 20:07)  Source: .Urine Catheterized  Final Report (04 Apr 2019 01:17):    No growth    Culture - Blood (collected 02 Apr 2019 19:25)  Source: .Blood None  Preliminary Report (04 Apr 2019 03:01):    No growth to date.    Culture - Blood (collected 02 Apr 2019 13:07)  Source: .Blood Blood-Peripheral  Preliminary Report (03 Apr 2019 23:01):    No growth to date.    Culture - Blood (collected 02 Apr 2019 13:07)  Source: .Blood Blood-Peripheral  Preliminary Report (03 Apr 2019 23:01):    No growth to date.              amiodarone Infusion 1 mG/Min IV Continuous <Continuous>  amiodarone Infusion 0.5 mG/Min IV Continuous <Continuous>  aspirin  chewable 81 milliGRAM(s) Oral daily  atorvastatin 20 milliGRAM(s) Oral at bedtime  dextrose 5%. 1000 milliLiter(s) IV Continuous <Continuous>  heparin  Injectable 5000 Unit(s) SubCutaneous every 8 hours  hydrocortisone sodium succinate Injectable 50 milliGRAM(s) IV Push every 8 hours  levothyroxine Injectable 25 MICROGram(s) IV Push at bedtime  meropenem  IVPB 500 milliGRAM(s) IV Intermittent every 24 hours  morphine  - Injectable 4 milliGRAM(s) IV Push every 2 hours PRN  pantoprazole  Injectable 40 milliGRAM(s) IV Push daily  sodium chloride 0.9% lock flush 10 milliLiter(s) IV Push every 8 hours PRN  tamsulosin 0.4 milliGRAM(s) Oral at bedtime

## 2019-04-05 NOTE — SWALLOW BEDSIDE ASSESSMENT ADULT - ADDITIONAL RECOMMENDATIONS
possible candidate for ice chips or water protocol for comfort as outlined by MD vazquez. Patient did not appear to have the cognitive processing and awareness for PO consumption.

## 2019-04-05 NOTE — PROGRESS NOTE ADULT - ASSESSMENT
79/M with LEE on CKD 3 (creat was reportedly 1.7 mg% as per PMD), DM, HTN, AAA repair, admitted with change in SM found to be in LEE.    LEE on CKD 3 - due to retention and and volume depletion  - improving with IVF and Anderson cath  - on D5W at 100 cc/hr  - kidneys no hydro, renal cysts  - bone lesions mets noted, unknown primary  - strict is and OS  Possible partial DI - low urine Osm 270 with serum Sodium 155, but pt is not polyuric, still dehydrated for now  - can be mixed picture of postobstructive diuresis and free water diuresis    Palliative care was called  Cont current management  will sign off  call as needed  d/w HS

## 2019-04-05 NOTE — PROGRESS NOTE ADULT - ASSESSMENT
Afib rate controlled. Completing IV Amiodarone.  Now DNR/DNI.  When able take PO . Amiodarone 200 once a day. OOB to chair if stable. Prognosis poor.

## 2019-04-05 NOTE — PROGRESS NOTE ADULT - SUBJECTIVE AND OBJECTIVE BOX
Patient's family suspect that Vincenzo is having some  pain, he does not communicate well at baseline      T(F): 96.3 (04-05-19 @ 07:05), Max: 96.3 (04-05-19 @ 05:36)  HR: 100 (04-05-19 @ 07:11)  BP: 103/64 (04-05-19 @ 07:11)  RR: 19 (04-05-19 @ 07:11)  SpO2: 91% (04-05-19 @ 07:11) (91% - 93%)    PHYSICAL EXAM:  GENERAL: NAD  HEAD:  Atraumatic, Normocephalic  NERVOUS SYSTEM:  no focal deficits  CHEST/LUNG:  bilateral rhonchi  HEART: Regular rate and rhythm; No murmurs, rubs, or gallops  ABDOMEN: Soft, Nontender, Nondistended; Bowel sounds present  EXTREMITIES:  2+ Peripheral Pulses, No clubbing, cyanosis, or edema  LYMPH: No lymphadenopathy noted  SKIN: No rashes or lesions    LABS  04-05    151<H>  |  119<H>  |  88<HH>  ----------------------------<  167<H>  4.5   |  22  |  2.5<H>    Ca    8.6      05 Apr 2019 05:30  Mg     2.1     04-05    TPro  5.2<L>  /  Alb  2.4<L>  /  TBili  0.4  /  DBili  x   /  AST  15  /  ALT  16  /  AlkPhos  153<H>  04-04                          10.8   13.00 )-----------( 88       ( 05 Apr 2019 05:30 )             34.8         CARDIAC ENZYMES  Creatine Kinase, Serum: 59 (04-03 @ 10:54)    CKMB Units: 5.0 (04-03 @ 10:54)    Troponin T, Serum: 0.03 ng/mL (04-03-19 @ 10:54)  Troponin T, Serum: 0.05 ng/mL (04-03-19 @ 06:56)  Troponin T, Serum: 0.03 ng/mL (04-02-19 @ 19:25)  Troponin T, Serum: 0.05 ng/mL (04-02-19 @ 13:05)      Culture Results:   No growth to date. (04-03-19)  Culture Results:   No growth (04-02-19)  Culture Results:   No growth to date. (04-02-19)  Culture Results:   No growth to date. (04-02-19)  Culture Results:   No growth to date. (04-02-19)    RADIOLOGY  < from: Xray Chest 1 View- PORTABLE-Routine (04.04.19 @ 05:56) >  Impression:      Bilateral pleural effusions and atelectasis.      < from: 12 Lead ECG (04.04.19 @ 07:57) >  Diagnosis Line Atrial fibrillation with rapid ventricular response    < end of copied text >      MEDICATIONS  (STANDING):  amiodarone    Tablet 200 milliGRAM(s) Oral daily  amiodarone Infusion 1 mG/Min (33.333 mL/Hr) IV Continuous <Continuous>  amiodarone Infusion 0.5 mG/Min (16.667 mL/Hr) IV Continuous <Continuous>  aspirin  chewable 81 milliGRAM(s) Oral daily  atorvastatin 20 milliGRAM(s) Oral at bedtime  dextrose 5%. 1000 milliLiter(s) (100 mL/Hr) IV Continuous <Continuous>  heparin  Injectable 5000 Unit(s) SubCutaneous every 8 hours  hydrocortisone sodium succinate Injectable 50 milliGRAM(s) IV Push every 8 hours  levothyroxine Injectable 25 MICROGram(s) IV Push at bedtime  meropenem  IVPB 500 milliGRAM(s) IV Intermittent every 24 hours  pantoprazole  Injectable 40 milliGRAM(s) IV Push daily  tamsulosin 0.4 milliGRAM(s) Oral at bedtime    MEDICATIONS  (PRN):  morphine  - Injectable 4 milliGRAM(s) IV Push every 2 hours PRN pain, dyspnea  sodium chloride 0.9% lock flush 10 milliLiter(s) IV Push every 8 hours PRN Pre/post blood products, medications, blood draw, and to maintain line patency

## 2019-04-05 NOTE — PROGRESS NOTE ADULT - SUBJECTIVE AND OBJECTIVE BOX
Nephrology progress note    Patient is seen and examined, events over the last 24 h noted .  On IVF, hospice was called.  Allergies:  No Known Allergies    Hospital Medications:   MEDICATIONS  (STANDING):  amiodarone    Tablet 200 milliGRAM(s) Oral daily  aspirin  chewable 81 milliGRAM(s) Oral daily  atorvastatin 20 milliGRAM(s) Oral at bedtime  dextrose 5%. 1000 milliLiter(s) (100 mL/Hr) IV Continuous <Continuous>  fentaNYL   Patch  12 MICROgram(s)/Hr. 1 Patch Transdermal every 48 hours  heparin  Injectable 5000 Unit(s) SubCutaneous every 8 hours  hydrocortisone sodium succinate Injectable 50 milliGRAM(s) IV Push two times a day  levothyroxine Injectable 25 MICROGram(s) IV Push at bedtime  meropenem  IVPB 500 milliGRAM(s) IV Intermittent every 24 hours  pantoprazole  Injectable 40 milliGRAM(s) IV Push daily  tamsulosin 0.4 milliGRAM(s) Oral at bedtime        VITALS:  T(F): 96.3 (19 @ 07:05), Max: 96.3 (19 @ 05:36)  HR: 100 (19 @ 07:11)  BP: 103/64 (19 @ 07:11)  RR: 19 (19 @ 07:11)  SpO2: 91% (19 @ 07:11)  Wt(kg): --     @ 07:01  -   @ 07:00  --------------------------------------------------------  IN: 1048.2 mL / OUT: 1925 mL / NET: -876.8 mL     @ 07:01  -   @ 07:00  --------------------------------------------------------  IN: 2300.3 mL / OUT: 1195 mL / NET: 1105.3 mL     @ 07:01  -   @ 14:54  --------------------------------------------------------  IN: 16.7 mL / OUT: 350 mL / NET: -333.3 mL          PHYSICAL EXAM:  Constitutional: NAD  HEENT: anicteric sclera, dry oral mucosa  Neck: No JVD  Respiratory: CTAB, no wheezes, rales or rhonchi  Cardiovascular: S1, S2, RRR  Gastrointestinal: BS+, soft, NT/ND  Extremities: No cyanosis or clubbing. No peripheral edema  Neurological: awake  :  sanchez+.   Skin: No rashes  Vascular Access:    LABS:      151<H>  |  119<H>  |  88<HH>  ----------------------------<  167<H>  4.5   |  22  |  2.5<H>    Ca    8.6      2019 05:30  Mg     2.1         TPro  5.2<L>  /  Alb  2.4<L>  /  TBili  0.4  /  DBili      /  AST  15  /  ALT  16  /  AlkPhos  153<H>                            10.8   13.00 )-----------( 88       ( 2019 05:30 )             34.8       Urine Studies:  Urinalysis Basic - ( 2019 14:24 )    Color: Yellow / Appearance: Clear / S.015 / pH:   Gluc:  / Ketone: Negative  / Bili: Negative / Urobili: 0.2 mg/dL   Blood:  / Protein: Trace mg/dL / Nitrite: Negative   Leuk Esterase: Moderate / RBC: 6-10 /HPF / WBC 26-50 /HPF   Sq Epi:  / Non Sq Epi:  / Bacteria:       Osmolality, Random Urine: 267 mos/kg ( @ 12:53)  Sodium, Random Urine: 23.0 mmoL/L ( @ 12:53)  Protein/Creatinine Ratio Calculation: 0.7 Ratio ( @ 12:53)  Creatinine, Random Urine: 32 mg/dL ( @ 12:53)    RADIOLOGY & ADDITIONAL STUDIES:

## 2019-04-05 NOTE — SWALLOW BEDSIDE ASSESSMENT ADULT - NS SPL SWALLOW CLINIC TRIAL FT
overt s/s of aspiration and penetration on thin and Nectar-thickened liquids   pt's cognitive status negatively impacting swallow function moderate to severe oral impairment likely due to cognitive deficits. paient exhaled upon acceptance of trials, patient with anterior loss of bolus  pt not appropriate for PO trials at this time; awaiting hospice consult for pallative/comfort measures

## 2019-04-05 NOTE — PROGRESS NOTE ADULT - SUBJECTIVE AND OBJECTIVE BOX
80 y/o M from [facility], PMHx DMII (on metformin), hypothyroidism, CHF presenting for AMS x2 weeks, slurred speech xcouple days, and dramatic weight loss over the past 3 months. In th Ed pt was hypotensive, had CT angio w/ IV contrast done which showed no dissection but metastatic dx to bone w/ unclear primary. Bloodwork then showed LEE with Cr to 5.1, hyperkalemia to 6.9. Anderson placed and over 1 liter cloudy urine came out. Family was informed of concern for malignancy and pt's frailty, niece spoke with Dr Young and decided to make him DNR/DNI. She came in 4/3 and made him comfort measures, no pressors support, but was ok with giving IV fluids and abx. Currently pt is off pressors, maintaining his own blood pressure.    Labs:  CARDIAC MARKERS ( 03 Apr 2019 10:54 )  x     / 0.03 ng/mL / 59 U/L / x     / 5.0 ng/mL                        10.8   13.00 )-----------( 88       ( 05 Apr 2019 05:30 )             34.8     04-05  151<H>  |  119<H>  |  88<HH>  ----------------------------<  167<H>  4.5   |  22  |  2.5<H>  Ca    8.6      05 Apr 2019 05:30  Mg     2.1     04-05  TPro  5.2<L>  /  Alb  2.4<L>  /  TBili  0.4  /  DBili  x   /  AST  15  /  ALT  16  /  AlkPhos  153<H>  04-04      CAPILLARY BLOOD GLUCOSE  POCT Blood Glucose.: 150 mg/dL (05 Apr 2019 07:14)  POCT Blood Glucose.: 167 mg/dL (04 Apr 2019 21:12)  POCT Blood Glucose.: 226 mg/dL (04 Apr 2019 16:22)  POCT Blood Glucose.: 183 mg/dL (04 Apr 2019 11:28)    LIVER FUNCTIONS - ( 04 Apr 2019 19:32 )  Alb: 2.4 g/dL / Pro: 5.2 g/dL / ALK PHOS: 153 U/L / ALT: 16 U/L / AST: 15 U/L / GGT: x           I&O's Summary    04 Apr 2019 07:01  -  05 Apr 2019 07:00  --------------------------------------------------------  IN: 2300.3 mL / OUT: 1195 mL / NET: 1105.3 mL    05 Apr 2019 07:01 - 05 Apr 2019 09:58  --------------------------------------------------------  IN: 16.7 mL / OUT: 0 mL / NET: 16.7 mL      .Blood None  04-03-19   No growth to date.  --  --    .Urine Catheterized  04-02-19   No growth  --  --    .Blood None  04-02-19   No growth to date.  --  --    .Blood Blood-Peripheral  04-02-19   No growth to date.  --  --      Physical Exam:  Cardiac: afib, Hr better controlled on amio  Lungs: +significant upper airway secretions  Abd: NTND, +BS  LE: no swelling  Neuro: AAOx2, nonfocal  Access: RIJ central line 80 y/o M from [facility], PMHx DMII (on metformin), hypothyroidism, CHF presenting for AMS x2 weeks, slurred speech xcouple days, and dramatic weight loss over the past 3 months. In th Ed pt was hypotensive, had CT angio w/ IV contrast done which showed no dissection but metastatic dx to bone w/ unclear primary. Bloodwork then showed LEE with Cr to 5.1, hyperkalemia to 6.9. Anderson placed and over 1 liter cloudy urine came out. Family was informed of concern for malignancy and pt's frailty, niece spoke with Dr Young and decided to make him DNR/DNI. She came in 4/3 and made him comfort measures, no pressor support, but was ok with giving IV fluids and abx. Currently pt is off pressors, maintaining his own blood pressure. Niece asking for hospice consult, family refusing any further aggressive measures. Imaging does show metastatic disease to the bones, which suggests advanced cancer and this paired with his frailty makes him a good hospice candidate.  Poor prognosis.    Labs:  CARDIAC MARKERS ( 03 Apr 2019 10:54 )  x     / 0.03 ng/mL / 59 U/L / x     / 5.0 ng/mL                        10.8   13.00 )-----------( 88       ( 05 Apr 2019 05:30 )             34.8     04-05  151<H>  |  119<H>  |  88<HH>  ----------------------------<  167<H>  4.5   |  22  |  2.5<H>  Ca    8.6      05 Apr 2019 05:30  Mg     2.1     04-05  TPro  5.2<L>  /  Alb  2.4<L>  /  TBili  0.4  /  DBili  x   /  AST  15  /  ALT  16  /  AlkPhos  153<H>  04-04      CAPILLARY BLOOD GLUCOSE  POCT Blood Glucose.: 150 mg/dL (05 Apr 2019 07:14)  POCT Blood Glucose.: 167 mg/dL (04 Apr 2019 21:12)  POCT Blood Glucose.: 226 mg/dL (04 Apr 2019 16:22)  POCT Blood Glucose.: 183 mg/dL (04 Apr 2019 11:28)    LIVER FUNCTIONS - ( 04 Apr 2019 19:32 )  Alb: 2.4 g/dL / Pro: 5.2 g/dL / ALK PHOS: 153 U/L / ALT: 16 U/L / AST: 15 U/L / GGT: x           I&O's Summary    04 Apr 2019 07:01  -  05 Apr 2019 07:00  --------------------------------------------------------  IN: 2300.3 mL / OUT: 1195 mL / NET: 1105.3 mL    05 Apr 2019 07:01  -  05 Apr 2019 09:58  --------------------------------------------------------  IN: 16.7 mL / OUT: 0 mL / NET: 16.7 mL      .Blood None  04-03-19   No growth to date.  --  --    .Urine Catheterized  04-02-19   No growth  --  --    .Blood None  04-02-19   No growth to date.  --  --    .Blood Blood-Peripheral  04-02-19   No growth to date.  --  --      Physical Exam:  Cardiac: afib, Hr better controlled on amio  Lungs: +significant upper airway secretions  Abd: NTND, +BS  LE: no swelling  Neuro: AAOx2, nonfocal  Access: RIJ central line

## 2019-04-05 NOTE — PROGRESS NOTE ADULT - PROBLEM SELECTOR PLAN 6
together with suspected metastasis seen on ct chest  family suspect the pt is in  pain, will start fentanyl patch

## 2019-04-05 NOTE — PROGRESS NOTE ADULT - PROBLEM SELECTOR PLAN 1
resolving with good urine output  resolved metabolic acidosis and resolved hyperkalemia  bicarb and metformin dc'd  sepsis resolved now off pressors, WOULD TAPER STEROIDS  urine and blood cultures negative thus far  finish 3 more days of antibiotics as per ID  nephrology following  DVT prophylaxis

## 2019-04-05 NOTE — PROGRESS NOTE ADULT - SUBJECTIVE AND OBJECTIVE BOX
infectious diseases progress note:  LUKE HAM is a 79yMale patient    HYPOTENSION,UNSPECIFIED HYPOTENSION TYPE;ACUTE RENAL FAILURE,UNSPECIFIED    Atrial fibrillation  Slurred speech  Weight loss  CHF (congestive heart failure)  Hypothyroid  Diabetes mellitus, type 2  Hypotension, unspecified hypotension type  Acute renal failure, unspecified acute renal failure type      ROS:  not relevant    Allergies    No Known Allergies    Intolerances        ANTIBIOTICS/RELEVANT:  antimicrobials  meropenem  IVPB 500 milliGRAM(s) IV Intermittent every 24 hours    immunologic:    OTHER:  amiodarone Infusion 1 mG/Min IV Continuous <Continuous>  amiodarone Infusion 0.5 mG/Min IV Continuous <Continuous>  aspirin  chewable 81 milliGRAM(s) Oral daily  atorvastatin 20 milliGRAM(s) Oral at bedtime  dextrose 5%. 1000 milliLiter(s) IV Continuous <Continuous>  heparin  Injectable 5000 Unit(s) SubCutaneous every 8 hours  hydrocortisone sodium succinate Injectable 50 milliGRAM(s) IV Push every 8 hours  levothyroxine Injectable 25 MICROGram(s) IV Push at bedtime  morphine  - Injectable 4 milliGRAM(s) IV Push every 2 hours PRN  pantoprazole  Injectable 40 milliGRAM(s) IV Push daily  sodium chloride 0.9% lock flush 10 milliLiter(s) IV Push every 8 hours PRN  tamsulosin 0.4 milliGRAM(s) Oral at bedtime      Objective:  T(F): 95.9 (04-05-19 @ 04:24), Max: 96.3 (04-04-19 @ 07:10)  HR: 88 (04-05-19 @ 04:24) (88 - 133)  BP: 102/54 (04-04-19 @ 23:02) (91/68 - 117/62)  RR: 29 (04-05-19 @ 04:24) (18 - 34)  SpO2: 92% (04-05-19 @ 04:24) (89% - 99%)    PHYSICAL EXAM:  Constitutional: comfortable 	  Neck:no JVD, no lymphadenopathy, supple  Respiratory: CTA torie  Cardiovascular: S1S2+  Gastrointestinal:soft, (+) BS, no HSM. sacral decubs - superficial   Extremities: edema+        LABS:                        11.4   10.23 )-----------( 91       ( 04 Apr 2019 13:08 )             35.8     04-04    153<H>  |  120<H>  |  87<HH>  ----------------------------<  179<H>  4.2   |  21  |  2.5<H>    Ca    8.8      04 Apr 2019 19:32  Mg     2.1     04-04    TPro  5.2<L>  /  Alb  2.4<L>  /  TBili  0.4  /  DBili  x   /  AST  15  /  ALT  16  /  AlkPhos  153<H>  04-04            MICROBIOLOGY:    Culture - Blood (collected 04-03-19 @ 10:54)  Source: .Blood None  Preliminary Report (04-04-19 @ 22:00):    No growth to date.    Culture - Urine (collected 04-02-19 @ 20:07)  Source: .Urine Catheterized  Final Report (04-04-19 @ 01:17):    No growth    Culture - Blood (collected 04-02-19 @ 19:25)  Source: .Blood None  Preliminary Report (04-04-19 @ 03:01):    No growth to date.    Culture - Blood (collected 04-02-19 @ 13:07)  Source: .Blood Blood-Peripheral  Preliminary Report (04-03-19 @ 23:01):    No growth to date.    Culture - Blood (collected 04-02-19 @ 13:07)  Source: .Blood Blood-Peripheral  Preliminary Report (04-03-19 @ 23:01):    No growth to date.        Culture - Blood (collected 03 Apr 2019 10:54)  Source: .Blood None  Preliminary Report (04 Apr 2019 22:00):    No growth to date.    Culture - Urine (collected 02 Apr 2019 20:07)  Source: .Urine Catheterized  Final Report (04 Apr 2019 01:17):    No growth    Culture - Blood (collected 02 Apr 2019 19:25)  Source: .Blood None  Preliminary Report (04 Apr 2019 03:01):    No growth to date.    Culture - Blood (collected 02 Apr 2019 13:07)  Source: .Blood Blood-Peripheral  Preliminary Report (03 Apr 2019 23:01):    No growth to date.    Culture - Blood (collected 02 Apr 2019 13:07)  Source: .Blood Blood-Peripheral  Preliminary Report (03 Apr 2019 23:01):    No growth to date.      Culture Results:   No growth to date. (04-03 @ 10:54)  Culture Results:   No growth (04-02 @ 20:07)  Culture Results:   No growth to date. (04-02 @ 19:25)  Culture Results:   No growth to date. (04-02 @ 13:07)  Culture Results:   No growth to date. (04-02 @ 13:07)        RADIOLOGY & ADDITIONAL STUDIES:

## 2019-04-06 DIAGNOSIS — C80.1 MALIGNANT (PRIMARY) NEOPLASM, UNSPECIFIED: ICD-10-CM

## 2019-04-06 LAB
ALBUMIN SERPL ELPH-MCNC: 2.3 G/DL — LOW (ref 3.5–5.2)
ALP SERPL-CCNC: 164 U/L — HIGH (ref 30–115)
ALT FLD-CCNC: 16 U/L — SIGNIFICANT CHANGE UP (ref 0–41)
ANION GAP SERPL CALC-SCNC: 11 MMOL/L — SIGNIFICANT CHANGE UP (ref 7–14)
ANISOCYTOSIS BLD QL: SLIGHT — SIGNIFICANT CHANGE UP
AST SERPL-CCNC: 16 U/L — SIGNIFICANT CHANGE UP (ref 0–41)
BASOPHILS # BLD AUTO: 0.04 K/UL — SIGNIFICANT CHANGE UP (ref 0–0.2)
BASOPHILS NFR BLD AUTO: 0.3 % — SIGNIFICANT CHANGE UP (ref 0–1)
BILIRUB SERPL-MCNC: 0.5 MG/DL — SIGNIFICANT CHANGE UP (ref 0.2–1.2)
BUN SERPL-MCNC: 78 MG/DL — CRITICAL HIGH (ref 10–20)
BURR CELLS BLD QL SMEAR: SIGNIFICANT CHANGE UP
CALCIUM SERPL-MCNC: 8.1 MG/DL — LOW (ref 8.5–10.1)
CHLORIDE SERPL-SCNC: 119 MMOL/L — HIGH (ref 98–110)
CO2 SERPL-SCNC: 21 MMOL/L — SIGNIFICANT CHANGE UP (ref 17–32)
CREAT SERPL-MCNC: 2.4 MG/DL — HIGH (ref 0.7–1.5)
EOSINOPHIL # BLD AUTO: 0.01 K/UL — SIGNIFICANT CHANGE UP (ref 0–0.7)
EOSINOPHIL NFR BLD AUTO: 0.1 % — SIGNIFICANT CHANGE UP (ref 0–8)
GIANT PLATELETS BLD QL SMEAR: PRESENT — SIGNIFICANT CHANGE UP
GLUCOSE BLDC GLUCOMTR-MCNC: 110 MG/DL — HIGH (ref 70–99)
GLUCOSE BLDC GLUCOMTR-MCNC: 130 MG/DL — HIGH (ref 70–99)
GLUCOSE BLDC GLUCOMTR-MCNC: 152 MG/DL — HIGH (ref 70–99)
GLUCOSE BLDC GLUCOMTR-MCNC: 191 MG/DL — HIGH (ref 70–99)
GLUCOSE SERPL-MCNC: 126 MG/DL — HIGH (ref 70–99)
HCT VFR BLD CALC: 36.8 % — LOW (ref 42–52)
HGB BLD-MCNC: 11.5 G/DL — LOW (ref 14–18)
IMM GRANULOCYTES NFR BLD AUTO: 4.6 % — HIGH (ref 0.1–0.3)
LYMPHOCYTES # BLD AUTO: 0.45 K/UL — LOW (ref 1.2–3.4)
LYMPHOCYTES # BLD AUTO: 3.7 % — LOW (ref 20.5–51.1)
MCHC RBC-ENTMCNC: 29.7 PG — SIGNIFICANT CHANGE UP (ref 27–31)
MCHC RBC-ENTMCNC: 31.3 G/DL — LOW (ref 32–37)
MCV RBC AUTO: 95.1 FL — HIGH (ref 80–94)
METAMYELOCYTES # FLD: 1 % — HIGH (ref 0–0)
MONOCYTES # BLD AUTO: 0.74 K/UL — HIGH (ref 0.1–0.6)
MONOCYTES NFR BLD AUTO: 6.1 % — SIGNIFICANT CHANGE UP (ref 1.7–9.3)
MYELOCYTES NFR BLD: 1 % — HIGH (ref 0–0)
NEUTROPHILS # BLD AUTO: 10.25 K/UL — HIGH (ref 1.4–6.5)
NEUTROPHILS NFR BLD AUTO: 85.2 % — HIGH (ref 42.2–75.2)
NEUTS BAND # BLD: 20 % — HIGH (ref 0–6)
NRBC # BLD: 0 /100 WBCS — SIGNIFICANT CHANGE UP (ref 0–0)
NRBC # BLD: 0 /100 — SIGNIFICANT CHANGE UP (ref 0–0)
OVALOCYTES BLD QL SMEAR: SLIGHT — SIGNIFICANT CHANGE UP
PLAT MORPH BLD: NORMAL — SIGNIFICANT CHANGE UP
PLATELET # BLD AUTO: 99 K/UL — LOW (ref 130–400)
PLATELET COUNT - ESTIMATE: ABNORMAL
POIKILOCYTOSIS BLD QL AUTO: SIGNIFICANT CHANGE UP
POTASSIUM SERPL-MCNC: 4.2 MMOL/L — SIGNIFICANT CHANGE UP (ref 3.5–5)
POTASSIUM SERPL-SCNC: 4.2 MMOL/L — SIGNIFICANT CHANGE UP (ref 3.5–5)
PROT SERPL-MCNC: 5.1 G/DL — LOW (ref 6–8)
RBC # BLD: 3.87 M/UL — LOW (ref 4.7–6.1)
RBC # FLD: 16.4 % — HIGH (ref 11.5–14.5)
RBC BLD AUTO: NORMAL — SIGNIFICANT CHANGE UP
SODIUM SERPL-SCNC: 151 MMOL/L — HIGH (ref 135–146)
TOXIC GRANULES BLD QL SMEAR: PRESENT — SIGNIFICANT CHANGE UP
VARIANT LYMPHS # BLD: 2 % — SIGNIFICANT CHANGE UP (ref 0–5)
WBC # BLD: 12.05 K/UL — HIGH (ref 4.8–10.8)
WBC # FLD AUTO: 12.05 K/UL — HIGH (ref 4.8–10.8)

## 2019-04-06 PROCEDURE — 71045 X-RAY EXAM CHEST 1 VIEW: CPT | Mod: 26

## 2019-04-06 RX ADMIN — FENTANYL CITRATE 1 PATCH: 50 INJECTION INTRAVENOUS at 07:43

## 2019-04-06 RX ADMIN — TAMSULOSIN HYDROCHLORIDE 0.4 MILLIGRAM(S): 0.4 CAPSULE ORAL at 21:19

## 2019-04-06 RX ADMIN — SODIUM CHLORIDE 100 MILLILITER(S): 9 INJECTION, SOLUTION INTRAVENOUS at 18:17

## 2019-04-06 RX ADMIN — Medication 50 MILLIGRAM(S): at 17:12

## 2019-04-06 RX ADMIN — MEROPENEM 100 MILLIGRAM(S): 1 INJECTION INTRAVENOUS at 08:53

## 2019-04-06 RX ADMIN — HEPARIN SODIUM 5000 UNIT(S): 5000 INJECTION INTRAVENOUS; SUBCUTANEOUS at 13:47

## 2019-04-06 RX ADMIN — PANTOPRAZOLE SODIUM 40 MILLIGRAM(S): 20 TABLET, DELAYED RELEASE ORAL at 12:14

## 2019-04-06 RX ADMIN — AMIODARONE HYDROCHLORIDE 200 MILLIGRAM(S): 400 TABLET ORAL at 07:22

## 2019-04-06 RX ADMIN — HEPARIN SODIUM 5000 UNIT(S): 5000 INJECTION INTRAVENOUS; SUBCUTANEOUS at 21:19

## 2019-04-06 RX ADMIN — Medication 25 MICROGRAM(S): at 21:20

## 2019-04-06 RX ADMIN — FENTANYL CITRATE 1 PATCH: 50 INJECTION INTRAVENOUS at 21:22

## 2019-04-06 RX ADMIN — Medication 81 MILLIGRAM(S): at 14:16

## 2019-04-06 RX ADMIN — HEPARIN SODIUM 5000 UNIT(S): 5000 INJECTION INTRAVENOUS; SUBCUTANEOUS at 07:22

## 2019-04-06 RX ADMIN — ATORVASTATIN CALCIUM 20 MILLIGRAM(S): 80 TABLET, FILM COATED ORAL at 21:19

## 2019-04-06 RX ADMIN — SODIUM CHLORIDE 100 MILLILITER(S): 9 INJECTION, SOLUTION INTRAVENOUS at 08:53

## 2019-04-06 NOTE — PROGRESS NOTE ADULT - ASSESSMENT
80 y/o M from [facility], PMHx DMII (on metformin), hypothyroidism, CHF presenting for AMS x2 weeks, slurred speech for a couple days, and dramatic weight loss over the past 3 months. In th ED pt was hypotensive, had CT angio w/ IV contrast done which showed no dissection but metastatic dx to bone w/ unclear primary. Blood work then showed LEE with Cr to 5.1, hyperkalemia to 6.9. Anderson placed and over 1 liter cloudy urine came out. Family was informed of concern for malignancy and pt's frailty, niece spoke with Dr. Young and decided to make him DNR/DNI. She came in 4/3 and made him comfort measures, no pressor support, but was ok with giving IV fluids and abx. Currently pt is off pressors, maintaining his own blood pressure. Niece asking for hospice consult, family refusing any further aggressive measures. Imaging does show metastatic disease to the bones, which suggests advanced cancer and this paired with his frailty makes him a good hospice candidate.  Poor prognosis.

## 2019-04-06 NOTE — PROGRESS NOTE ADULT - PROBLEM SELECTOR PLAN 9
primary unknown  pt on NGT feeds - currently clogged but RN/PA will try unclogger  had a long discussion with niny, Elly [phone], she will speak to her  and decided if she will remove the NGT and pursue hospice or move forward with PEG placement  DNR/DNI  overall poor prognosis

## 2019-04-06 NOTE — PROGRESS NOTE ADULT - PROBLEM SELECTOR PLAN 1
Cr stable with good urine output  resolved metabolic acidosis and resolved hyperkalemia  bicarb and metformin dc'd  sepsis resolved now off pressors, WOULD TAPER STEROIDS  urine and blood cultures negative thus far  finish 2 more days of antibiotics as per ID  nephrology signed off  DVT prophylaxis

## 2019-04-06 NOTE — PROGRESS NOTE ADULT - PROBLEM SELECTOR PLAN 6
together with suspected metastasis seen on ct chest  family suspect the pt is in pain, will started fentanyl

## 2019-04-06 NOTE — PROGRESS NOTE ADULT - PROBLEM SELECTOR PLAN 2
finish course of Meropenem  cultures negative  possible aspiration pneumonia  S&S follow up  continue free water for hypernatremia  on IVF and NGT feeds for now

## 2019-04-06 NOTE — PROGRESS NOTE ADULT - SUBJECTIVE AND OBJECTIVE BOX
Patient seen and examined earlier today.  Lying comfortably in bed.  Unable to communicate.  NGT and sanchez in place.       Vital Signs Last 24 Hrs  T(C): 36.8 (06 Apr 2019 06:00), Max: 36.8 (06 Apr 2019 06:00)  T(F): 98.3 (06 Apr 2019 06:00), Max: 98.3 (06 Apr 2019 06:00)  HR: 102 (06 Apr 2019 06:00) (89 - 102)  BP: 110/65 (06 Apr 2019 06:00) (93/66 - 124/78)  BP(mean): 76 (05 Apr 2019 23:17) (75 - 76)  RR: 16 (06 Apr 2019 06:00) (16 - 22)  SpO2: 91% (06 Apr 2019 11:20) (91% - 93%) on ra    PHYSICAL EXAM:  GENERAL: NAD  HEAD:  Atraumatic, Normocephalic  NERVOUS SYSTEM:  no focal deficits  CHEST/LUNG:  bilateral rhonchi  HEART: Regular rate and rhythm; No murmurs, rubs, or gallops  ABDOMEN: Soft, Nontender, Nondistended; Bowel sounds present, sanchez and NGT present   EXTREMITIES:  2+ Peripheral Pulses, No clubbing, cyanosis, or edema  LYMPH: No lymphadenopathy noted  SKIN: No rashes or lesions    LABS                          11.5   12.05 )-----------( 99       ( 06 Apr 2019 06:52 )             36.8     04-06    151<H>  |  119<H>  |  78<HH>  ----------------------------<  126<H>  4.2   |  21  |  2.4<H>    Ca    8.1<L>      06 Apr 2019 06:52  Mg     2.1     04-05    TPro  5.1<L>  /  Alb  2.3<L>  /  TBili  0.5  /  DBili  x   /  AST  16  /  ALT  16  /  AlkPhos  164<H>  04-06 04-05    151<H>  |  119<H>  |  88<HH>  ----------------------------<  167<H>  4.5   |  22  |  2.5<H>    Ca    8.6      05 Apr 2019 05:30  Mg     2.1     04-05    TPro  5.2<L>  /  Alb  2.4<L>  /  TBili  0.4  /  DBili  x   /  AST  15  /  ALT  16  /  AlkPhos  153<H>  04-04                          10.8   13.00 )-----------( 88       ( 05 Apr 2019 05:30 )             34.8       Culture Results:   No growth to date. (04-03-19)  Culture Results:   No growth (04-02-19)  Culture Results:   No growth to date. (04-02-19)  Culture Results:   No growth to date. (04-02-19)  Culture Results:   No growth to date. (04-02-19)    RADIOLOGY  < from: Xray Chest 1 View- PORTABLE-Routine (04.04.19 @ 05:56) >  Impression:      Bilateral pleural effusions and atelectasis.      < from: 12 Lead ECG (04.04.19 @ 07:57) >  Diagnosis Line Atrial fibrillation with rapid ventricular response    < end of copied text >      MEDICATIONS  (STANDING):  amiodarone    Tablet 200 milliGRAM(s) Oral daily  aspirin  chewable 81 milliGRAM(s) Oral daily  atorvastatin 20 milliGRAM(s) Oral at bedtime  dextrose 5%. 1000 milliLiter(s) (100 mL/Hr) IV Continuous <Continuous>  fentaNYL   Patch  12 MICROgram(s)/Hr. 1 Patch Transdermal every 48 hours  heparin  Injectable 5000 Unit(s) SubCutaneous every 8 hours  hydrocortisone sodium succinate Injectable 50 milliGRAM(s) IV Push two times a day  levothyroxine Injectable 25 MICROGram(s) IV Push at bedtime  meropenem  IVPB 500 milliGRAM(s) IV Intermittent every 24 hours  pantoprazole  Injectable 40 milliGRAM(s) IV Push daily  tamsulosin 0.4 milliGRAM(s) Oral at bedtime    MEDICATIONS  (PRN):  sodium chloride 0.9% lock flush 10 milliLiter(s) IV Push every 8 hours PRN Pre/post blood products, medications, blood draw, and to maintain line patency

## 2019-04-06 NOTE — PROVIDER CONTACT NOTE (OTHER) - SITUATION
After placement checked with stethoscope by Night RN and myself, NG tube not flushing and resistance met.

## 2019-04-07 DIAGNOSIS — G93.41 METABOLIC ENCEPHALOPATHY: ICD-10-CM

## 2019-04-07 LAB
ALBUMIN SERPL ELPH-MCNC: 2.2 G/DL — LOW (ref 3.5–5.2)
ALP SERPL-CCNC: 162 U/L — HIGH (ref 30–115)
ALT FLD-CCNC: 16 U/L — SIGNIFICANT CHANGE UP (ref 0–41)
ANION GAP SERPL CALC-SCNC: 11 MMOL/L — SIGNIFICANT CHANGE UP (ref 7–14)
AST SERPL-CCNC: 17 U/L — SIGNIFICANT CHANGE UP (ref 0–41)
BILIRUB SERPL-MCNC: 0.5 MG/DL — SIGNIFICANT CHANGE UP (ref 0.2–1.2)
BUN SERPL-MCNC: 64 MG/DL — CRITICAL HIGH (ref 10–20)
CALCIUM SERPL-MCNC: 7.6 MG/DL — LOW (ref 8.5–10.1)
CHLORIDE SERPL-SCNC: 116 MMOL/L — HIGH (ref 98–110)
CO2 SERPL-SCNC: 19 MMOL/L — SIGNIFICANT CHANGE UP (ref 17–32)
CREAT SERPL-MCNC: 1.7 MG/DL — HIGH (ref 0.7–1.5)
CULTURE RESULTS: SIGNIFICANT CHANGE UP
CULTURE RESULTS: SIGNIFICANT CHANGE UP
GLUCOSE BLDC GLUCOMTR-MCNC: 167 MG/DL — HIGH (ref 70–99)
GLUCOSE BLDC GLUCOMTR-MCNC: 197 MG/DL — HIGH (ref 70–99)
GLUCOSE SERPL-MCNC: 118 MG/DL — HIGH (ref 70–99)
HCT VFR BLD CALC: 38.3 % — LOW (ref 42–52)
HGB BLD-MCNC: 12.2 G/DL — LOW (ref 14–18)
MCHC RBC-ENTMCNC: 30.2 PG — SIGNIFICANT CHANGE UP (ref 27–31)
MCHC RBC-ENTMCNC: 31.9 G/DL — LOW (ref 32–37)
MCV RBC AUTO: 94.8 FL — HIGH (ref 80–94)
NRBC # BLD: 0 /100 WBCS — SIGNIFICANT CHANGE UP (ref 0–0)
PLATELET # BLD AUTO: 99 K/UL — LOW (ref 130–400)
POTASSIUM SERPL-MCNC: 4.2 MMOL/L — SIGNIFICANT CHANGE UP (ref 3.5–5)
POTASSIUM SERPL-SCNC: 4.2 MMOL/L — SIGNIFICANT CHANGE UP (ref 3.5–5)
PROT SERPL-MCNC: 5 G/DL — LOW (ref 6–8)
RBC # BLD: 4.04 M/UL — LOW (ref 4.7–6.1)
RBC # FLD: 15.9 % — HIGH (ref 11.5–14.5)
SODIUM SERPL-SCNC: 146 MMOL/L — SIGNIFICANT CHANGE UP (ref 135–146)
SPECIMEN SOURCE: SIGNIFICANT CHANGE UP
SPECIMEN SOURCE: SIGNIFICANT CHANGE UP
WBC # BLD: 11.48 K/UL — HIGH (ref 4.8–10.8)
WBC # FLD AUTO: 11.48 K/UL — HIGH (ref 4.8–10.8)

## 2019-04-07 RX ORDER — MORPHINE SULFATE 50 MG/1
2 CAPSULE, EXTENDED RELEASE ORAL
Qty: 0 | Refills: 0 | Status: DISCONTINUED | OUTPATIENT
Start: 2019-04-07 | End: 2019-04-09

## 2019-04-07 RX ORDER — HYDROCORTISONE 20 MG
25 TABLET ORAL
Qty: 0 | Refills: 0 | Status: DISCONTINUED | OUTPATIENT
Start: 2019-04-07 | End: 2019-04-07

## 2019-04-07 RX ORDER — ATROPINE SULFATE 1 %
2 DROPS OPHTHALMIC (EYE) EVERY 8 HOURS
Qty: 0 | Refills: 0 | Status: DISCONTINUED | OUTPATIENT
Start: 2019-04-07 | End: 2019-04-09

## 2019-04-07 RX ORDER — ONDANSETRON 8 MG/1
4 TABLET, FILM COATED ORAL EVERY 8 HOURS
Qty: 0 | Refills: 0 | Status: DISCONTINUED | OUTPATIENT
Start: 2019-04-07 | End: 2019-04-09

## 2019-04-07 RX ADMIN — HEPARIN SODIUM 5000 UNIT(S): 5000 INJECTION INTRAVENOUS; SUBCUTANEOUS at 05:39

## 2019-04-07 RX ADMIN — FENTANYL CITRATE 1 PATCH: 50 INJECTION INTRAVENOUS at 11:36

## 2019-04-07 RX ADMIN — FENTANYL CITRATE 1 PATCH: 50 INJECTION INTRAVENOUS at 07:35

## 2019-04-07 RX ADMIN — MEROPENEM 100 MILLIGRAM(S): 1 INJECTION INTRAVENOUS at 05:39

## 2019-04-07 RX ADMIN — MORPHINE SULFATE 2 MILLIGRAM(S): 50 CAPSULE, EXTENDED RELEASE ORAL at 19:49

## 2019-04-07 RX ADMIN — MORPHINE SULFATE 2 MILLIGRAM(S): 50 CAPSULE, EXTENDED RELEASE ORAL at 15:56

## 2019-04-07 RX ADMIN — FENTANYL CITRATE 1 PATCH: 50 INJECTION INTRAVENOUS at 11:35

## 2019-04-07 RX ADMIN — Medication 81 MILLIGRAM(S): at 11:35

## 2019-04-07 RX ADMIN — AMIODARONE HYDROCHLORIDE 200 MILLIGRAM(S): 400 TABLET ORAL at 05:38

## 2019-04-07 RX ADMIN — FENTANYL CITRATE 1 PATCH: 50 INJECTION INTRAVENOUS at 19:23

## 2019-04-07 RX ADMIN — Medication 50 MILLIGRAM(S): at 05:39

## 2019-04-07 RX ADMIN — MORPHINE SULFATE 2 MILLIGRAM(S): 50 CAPSULE, EXTENDED RELEASE ORAL at 15:52

## 2019-04-07 RX ADMIN — PANTOPRAZOLE SODIUM 40 MILLIGRAM(S): 20 TABLET, DELAYED RELEASE ORAL at 11:35

## 2019-04-07 NOTE — PROGRESS NOTE ADULT - ASSESSMENT
79/M with LEE on CKD 3 (creat was reportedly 1.7 mg% as per PMD), DM, HTN, AAA repair, admitted with change in SM found to be in LEE.    LEE on CKD 3 - due to retention and and volume depletion  - improving with IVF and Anderson cath  - on D5W at 100 cc/hr  - kidneys no hydro, renal cysts  - bone lesions mets noted, unknown primary  - strict is and OS  Possible partial DI - low urine Osm 270 with serum Sodium 155, but pt is not polyuric, still dehydrated for now  - can be mixed picture of postobstructive diuresis and free water diuresis    GOC disucsion ongoing   Cont current management  will sign off  call as needed

## 2019-04-07 NOTE — PROGRESS NOTE ADULT - PROBLEM SELECTOR PLAN 1
Cr stable with good urine output  resolved metabolic acidosis and resolved hyperkalemia  bicarb and metformin dc'd  sepsis resolved now off pressors, WOULD TAPER STEROIDS  urine and blood cultures negative thus far  finish 2 more days of antibiotics as per ID  nephrology signed off  DVT prophylaxis Cr improving with good urine output  resolved metabolic acidosis and resolved hyperkalemia  bicarb and metformin dc'd  sepsis resolved now off pressors, WOULD TAPER STEROIDS  urine and blood cultures negative thus far  finish 2 more days of antibiotics as per ID  nephrology signed off

## 2019-04-07 NOTE — PROGRESS NOTE ADULT - PROBLEM SELECTOR PLAN 8
amiodarone IV Toxic /Metabolic Encephalopathy secondary to LEE/malignancy w severe protein calorie malnutrition, and DI   Improving somewhat but overall prognosis poor Toxic /Metabolic Encephalopathy secondary to LEE/malignancy w severe protein calorie malnutrition, and DI     overall prognosis poor

## 2019-04-07 NOTE — PROGRESS NOTE ADULT - PROBLEM SELECTOR PLAN 7
ct head- no acute pathology primary unknown  pt on NGT feeds, has had some issues w NGT clogging    niece, Elly [phone], to decide if she will remove the NGT and pursue hospice or move forward with PEG placement  DNR/DNI  overall poor prognosis primary unknown   niece, Elly [phone], decided on 4/7 against feeding tube placement.  Will DC NGT and transition to comfort care only.  Hospice/CSM consult tomorrow for placement.    DNR/DNI  overall poor prognosis

## 2019-04-07 NOTE — PROGRESS NOTE ADULT - PROBLEM SELECTOR PLAN 6
together with suspected metastasis seen on ct chest  family suspect the pt is in pain, will started fentanyl cont amiodarone amio to be stopped.  transition to CMO

## 2019-04-07 NOTE — PROGRESS NOTE ADULT - ASSESSMENT
80 y/o M from [facility], PMHx DMII (on metformin), hypothyroidism, CHF presenting for AMS x2 weeks, slurred speech for a couple days, and dramatic weight loss over the past 3 months. In th ED pt was hypotensive, had CT angio w/ IV contrast done which showed no dissection but metastatic dx to bone w/ unclear primary. Blood work then showed LEE with Cr to 5.1, hyperkalemia to 6.9. Anderson placed and over 1 liter cloudy urine came out. Family was informed of concern for malignancy and pt's frailty, niece spoke with Dr. Young and decided to make him DNR/DNI. She came in 4/3 and made him comfort measures, no pressor support, but was ok with giving IV fluids and abx. Currently pt is off pressors, maintaining his own blood pressure. Niece asking for hospice consult, family refusing any further aggressive measures. Imaging does show metastatic disease to the bones, which suggests advanced cancer and this paired with his frailty makes him a good hospice candidate.  Poor prognosis. 78 y/o M from [facility], PMHx DMII, HTN, AAA repair, CKD3, hypothyroidism who p/w AMS &  dramatic weight loss over the past 3 months. In th ED pt was hypotensive, had CT angio w/ IV contrast done which showed no dissection but metastatic dx to bone w/ unclear primary. Blood work then showed LEE with Cr to 5.1, hyperkalemia to 6.9. Anderson placed and over 1 liter cloudy urine came out. Family was informed of concern for malignancy and pt's frailty, niece spoke with Dr. Young and decided to make him DNR/DNI. She came in 4/3 and made him comfort measures, no pressor support, but was ok with giving IV fluids and abx. Currently pt is off pressors, maintaining his own blood pressure. Niece asking for hospice consult, family refusing any further aggressive measures. Imaging does show metastatic disease to the bones, which suggests advanced cancer and this paired with his frailty makes him a good hospice candidate.  Poor prognosis. 80 y/o M from [facility], PMHx DMII, HTN, AAA repair, CKD3, hypothyroidism who p/w AMS &  dramatic weight loss over the past 3 months. In th ED pt was hypotensive, had CT angio w/ IV contrast done which showed no dissection but metastatic dx to bone w/ unclear primary. Blood work then showed LEE with Cr to 5.1, hyperkalemia to 6.9. Anderson placed and over 1 liter cloudy urine came out. Family was informed of concern for malignancy and pt's frailty, niece spoke with Dr. Young and decided to make him DNR/DNI. She came in 4/3 and made him comfort measures, no pressor support, but was ok with giving IV fluids and abx. Currently pt is off pressors, maintaining his own blood pressure. Niece asking for hospice consult, family refusing any further aggressive measures. Imaging does show metastatic disease to the bones, which suggests advanced cancer and this paired with his frailty makes him a good hospice candidate.  Poor prognosis.      Spoke with family again on 4/7/19 and niece and family made the decision to transition the patient to comfort care only, with plans to transition to Hospice care now.  They will talk with Hospice/Case mgmt tomorrow regarding this decision and future planning on placement options.     Will DC active medications and use only comfort medications  DC NGT  PO diet as tolerated 78 y/o M from [facility], PMHx DMII, HTN, AAA repair, CKD3, hypothyroidism who p/w AMS &  dramatic weight loss over the past 3 months. In th ED pt was hypotensive, had CT angio w/ IV contrast done which showed no dissection but metastatic dx to bone w/ unclear primary. Blood work then showed LEE with Cr to 5.1, hyperkalemia to 6.9. Anderson placed and over 1 liter cloudy urine came out. Family was informed of concern for malignancy and pt's frailty, niece spoke with Dr. Young and decided to make him DNR/DNI. She came in 4/3 and made him comfort measures, no pressor support, but was ok with giving IV fluids and abx. Currently pt is off pressors, maintaining his own blood pressure. Niece asking for hospice consult, family refusing any further aggressive measures. Imaging does show metastatic disease to the bones, which suggests advanced cancer and this paired with his frailty makes him a good hospice candidate.  Poor prognosis.      Spoke with family again on 4/7/19 and niece and family made the decision to transition the patient to comfort care only, with plans to transition to Hospice care now.  They will talk with Hospice/Case mgmt tomorrow regarding this decision and future planning on placement options.     Will DC active medications and use only comfort medications  DC NGT  PO diet as tolerated    Advanced Care Planning discussion was held today with the patient and the family.  We discussed goals of care as noted above.  Total time for this discussion equalled one hour.

## 2019-04-07 NOTE — PROGRESS NOTE ADULT - PROBLEM SELECTOR PLAN 2
finish course of Meropenem  cultures negative  possible aspiration pneumonia  S&S follow up  continue free water for hypernatremia  on IVF and NGT feeds for now on Meropenem  cultures negative  possible aspiration pneumonia  S&S follow up  continue free water for hypernatremia  on IVF and NGT feeds for now  on IV solucortef, need taper was on Meropenem, will DC ABX  cultures negative  possible aspiration pneumonia  S&S follow up    free water PO if tolerates  DC IVF, IV steroids

## 2019-04-07 NOTE — PROGRESS NOTE ADULT - PROBLEM SELECTOR PLAN 9
primary unknown  pt on NGT feeds - currently clogged but RN/PA will try unclogger  had a long discussion with niny, Elly [phone], she will speak to her  and decided if she will remove the NGT and pursue hospice or move forward with PEG placement  DNR/DNI  overall poor prognosis

## 2019-04-07 NOTE — PROGRESS NOTE ADULT - SUBJECTIVE AND OBJECTIVE BOX
Patient seen and examined earlier today.  Lying comfortably in bed.  Unable to communicate clearly.  NGT and sanchez in place.     Vital Signs Last 24 Hrs  T(C): 36 (07 Apr 2019 06:41), Max: 36 (07 Apr 2019 06:41)  T(F): 96.8 (07 Apr 2019 06:41), Max: 96.8 (07 Apr 2019 06:41)  HR: 88 (07 Apr 2019 06:41) (88 - 99)  BP: 138/67 (07 Apr 2019 06:41) (98/56 - 138/67)  BP(mean): --  RR: 16 (07 Apr 2019 06:41) (16 - 16)  SpO2: --    PHYSICAL EXAM:  GENERAL: NAD, mumbles inaudible words  HEAD:  Atraumatic, Normocephalic  NERVOUS SYSTEM:  Awake, alert and responsive  CHEST/LUNG:  bilateral rhonchi  HEART: Regular rate and rhythm; No murmurs, rubs, or gallops  ABDOMEN: Soft, Nontender, Nondistended; Bowel sounds present, sanchez and NGT present   EXTREMITIES:  2+ Peripheral Pulses, No clubbing, cyanosis, or edema  LYMPH: No lymphadenopathy noted  SKIN: No rashes or lesions    LABS                            12.2   11.48 )-----------( 99       ( 07 Apr 2019 06:30 )             38.3     04-07    146  |  116<H>  |  64<HH>  ----------------------------<  118<H>  4.2   |  19  |  1.7<H>    Ca    7.6<L>      07 Apr 2019 06:30    TPro  5.0<L>  /  Alb  2.2<L>  /  TBili  0.5  /  DBili  x   /  AST  17  /  ALT  16  /  AlkPhos  162<H>  04-07        RADIOLOGY  < from: Xray Chest 1 View- PORTABLE-Routine (04.04.19 @ 05:56) >  Impression:      Bilateral pleural effusions and atelectasis.      < from: 12 Lead ECG (04.04.19 @ 07:57) >  Diagnosis Line Atrial fibrillation with rapid ventricular response    < end of copied text >      MEDICATIONS  (STANDING):  amiodarone    Tablet 200 milliGRAM(s) Oral daily  aspirin  chewable 81 milliGRAM(s) Oral daily  atorvastatin 20 milliGRAM(s) Oral at bedtime  dextrose 5%. 1000 milliLiter(s) (100 mL/Hr) IV Continuous <Continuous>  fentaNYL   Patch  12 MICROgram(s)/Hr. 1 Patch Transdermal every 48 hours  heparin  Injectable 5000 Unit(s) SubCutaneous every 8 hours  hydrocortisone sodium succinate Injectable 50 milliGRAM(s) IV Push two times a day  levothyroxine Injectable 25 MICROGram(s) IV Push at bedtime  meropenem  IVPB 500 milliGRAM(s) IV Intermittent every 24 hours  pantoprazole  Injectable 40 milliGRAM(s) IV Push daily  tamsulosin 0.4 milliGRAM(s) Oral at bedtime    MEDICATIONS  (PRN):  sodium chloride 0.9% lock flush 10 milliLiter(s) IV Push every 8 hours PRN Pre/post blood products, medications, blood draw, and to maintain line patency Patient seen and examined earlier today.  Lying comfortably in bed.  Unable to communicate clearly.  NGT and sanchez in place.     Vital Signs Last 24 Hrs  T(C): 36 (07 Apr 2019 06:41), Max: 36 (07 Apr 2019 06:41)  T(F): 96.8 (07 Apr 2019 06:41), Max: 96.8 (07 Apr 2019 06:41)  HR: 88 (07 Apr 2019 06:41) (88 - 99)  BP: 138/67 (07 Apr 2019 06:41) (98/56 - 138/67)  BP(mean): --  RR: 16 (07 Apr 2019 06:41) (16 - 16)  SpO2: --    PHYSICAL EXAM:  GENERAL: NAD, mumbles inaudible words  HEAD:  Atraumatic, Normocephalic  NERVOUS SYSTEM:  Awake, alert and responsive  CHEST/LUNG:  bilateral rhonchi  HEART: Regular rate and rhythm; No murmurs, rubs, or gallops  ABDOMEN: Soft, Nontender, Nondistended; Bowel sounds present, sanchez and NGT present   EXTREMITIES:  2+ Peripheral Pulses, No clubbing, cyanosis, or edema  LYMPH: No lymphadenopathy noted  SKIN: No rashes or lesions    LABS:                        12.2   11.48 )-----------( 99       ( 07 Apr 2019 06:30 )             38.3     04-07    146  |  116<H>  |  64<HH>  ----------------------------<  118<H>  4.2   |  19  |  1.7<H>    Ca    7.6<L>      07 Apr 2019 06:30    TPro  5.0<L>  /  Alb  2.2<L>  /  TBili  0.5  /  DBili  x   /  AST  17  /  ALT  16  /  AlkPhos  162<H>  04-07        RADIOLOGY   Xray Chest 1 View- PORTABLE-Routine (04.04.19 @ 05:56)   Impression:      Bilateral pleural effusions and atelectasis.      12 Lead ECG (04.04.19 @ 07:57)   Diagnosis Line Atrial fibrillation with rapid ventricular response

## 2019-04-07 NOTE — PROGRESS NOTE ADULT - SUBJECTIVE AND OBJECTIVE BOX
Nephrology progress note    Patient was seen and examined, events over the last 24 h noted .  Cr improving     Allergies:  No Known Allergies    Hospital Medications:   MEDICATIONS  (STANDING):  amiodarone    Tablet 200 milliGRAM(s) Oral daily  aspirin  chewable 81 milliGRAM(s) Oral daily  atorvastatin 20 milliGRAM(s) Oral at bedtime  dextrose 5%. 1000 milliLiter(s) (100 mL/Hr) IV Continuous <Continuous>  fentaNYL   Patch  12 MICROgram(s)/Hr. 1 Patch Transdermal every 48 hours  heparin  Injectable 5000 Unit(s) SubCutaneous every 8 hours  hydrocortisone sodium succinate Injectable 50 milliGRAM(s) IV Push two times a day  levothyroxine Injectable 25 MICROGram(s) IV Push at bedtime  meropenem  IVPB 500 milliGRAM(s) IV Intermittent every 24 hours  pantoprazole  Injectable 40 milliGRAM(s) IV Push daily  tamsulosin 0.4 milliGRAM(s) Oral at bedtime        VITALS:  T(F): 96.8 (19 @ 06:41), Max: 96.8 (19 @ 06:41)  HR: 88 (19 @ 06:41)  BP: 138/67 (19 @ 06:41)  RR: 16 (19 @ 06:41)  SpO2: 91% (19 @ 11:20)  Wt(kg): --     @ 07:01  -   @ 07:00  --------------------------------------------------------  IN: 1503.4 mL / OUT: 1250 mL / NET: 253.4 mL     @ 07:01  -   @ 07:00  --------------------------------------------------------  IN: 3180 mL / OUT: 1450 mL / NET: 1730 mL          PHYSICAL EXAM:  Constitutional: NAD  HEENT: anicteric sclera, dry oral mucosa  Neck: No JVD  Respiratory: CTAB, no wheezes, rales or rhonchi  Cardiovascular: S1, S2, RRR  Gastrointestinal: BS+, soft, NT/ND  Extremities: No cyanosis or clubbing. No peripheral edema  Neurological: awake  :  sanchez+.   Skin: No rashes  Vascular Access:    LABS:      146  |  116<H>  |  64<HH>  ----------------------------<  118<H>  4.2   |  19  |  1.7<H>    Ca    7.6<L>      2019 06:30    TPro  5.0<L>  /  Alb  2.2<L>  /  TBili  0.5  /  DBili      /  AST  17  /  ALT  16  /  AlkPhos  162<H>                            12.2   11.48 )-----------( 99       ( 2019 06:30 )             38.3       Urine Studies:  Urinalysis Basic - ( 2019 14:24 )    Color: Yellow / Appearance: Clear / S.015 / pH:   Gluc:  / Ketone: Negative  / Bili: Negative / Urobili: 0.2 mg/dL   Blood:  / Protein: Trace mg/dL / Nitrite: Negative   Leuk Esterase: Moderate / RBC: 6-10 /HPF / WBC 26-50 /HPF   Sq Epi:  / Non Sq Epi:  / Bacteria:       Osmolality, Random Urine: 267 mos/kg ( @ 12:53)  Sodium, Random Urine: 23.0 mmoL/L ( @ 12:53)  Protein/Creatinine Ratio Calculation: 0.7 Ratio ( @ 12:53)  Creatinine, Random Urine: 32 mg/dL ( @ 12:53)    RADIOLOGY & ADDITIONAL STUDIES:

## 2019-04-07 NOTE — PROGRESS NOTE ADULT - PROBLEM SELECTOR PLAN 5
f/u 2DECHO  not on diuretics due to hypernatremia  monitor intake and outpt f/u 2DECHO  not on diuretics due to hypernatremia  monitor I/O 2DECHO f/u  not on diuretics due to hypernatremia

## 2019-04-08 LAB
CULTURE RESULTS: SIGNIFICANT CHANGE UP
CULTURE RESULTS: SIGNIFICANT CHANGE UP
SPECIMEN SOURCE: SIGNIFICANT CHANGE UP
SPECIMEN SOURCE: SIGNIFICANT CHANGE UP

## 2019-04-08 RX ORDER — MORPHINE SULFATE 50 MG/1
2.5 CAPSULE, EXTENDED RELEASE ORAL
Qty: 100 | Refills: 0 | OUTPATIENT
Start: 2019-04-08

## 2019-04-08 RX ORDER — FENTANYL CITRATE 50 UG/ML
1 INJECTION INTRAVENOUS
Qty: 0 | Refills: 0 | COMMUNITY
Start: 2019-04-08

## 2019-04-08 RX ORDER — OXYBUTYNIN CHLORIDE 5 MG
0 TABLET ORAL
Qty: 0 | Refills: 0 | COMMUNITY

## 2019-04-08 RX ORDER — HYOSCYAMINE SULFATE 0.13 MG
1 TABLET ORAL
Qty: 20 | Refills: 0 | OUTPATIENT
Start: 2019-04-08

## 2019-04-08 RX ORDER — ASPIRIN/CALCIUM CARB/MAGNESIUM 324 MG
1 TABLET ORAL
Qty: 0 | Refills: 0 | COMMUNITY

## 2019-04-08 RX ORDER — ATORVASTATIN CALCIUM 80 MG/1
1 TABLET, FILM COATED ORAL
Qty: 0 | Refills: 0 | COMMUNITY

## 2019-04-08 RX ORDER — ONDANSETRON 8 MG/1
1 TABLET, FILM COATED ORAL
Qty: 30 | Refills: 0 | OUTPATIENT
Start: 2019-04-08

## 2019-04-08 RX ORDER — SENNA PLUS 8.6 MG/1
1 TABLET ORAL
Qty: 0 | Refills: 0 | COMMUNITY

## 2019-04-08 RX ORDER — METFORMIN HYDROCHLORIDE 850 MG/1
0 TABLET ORAL
Qty: 0 | Refills: 0 | COMMUNITY

## 2019-04-08 RX ORDER — TAMSULOSIN HYDROCHLORIDE 0.4 MG/1
1 CAPSULE ORAL
Qty: 0 | Refills: 0 | COMMUNITY

## 2019-04-08 RX ORDER — LEVOTHYROXINE SODIUM 125 MCG
1 TABLET ORAL
Qty: 0 | Refills: 0 | COMMUNITY

## 2019-04-08 RX ORDER — METOPROLOL TARTRATE 50 MG
1 TABLET ORAL
Qty: 0 | Refills: 0 | COMMUNITY

## 2019-04-08 RX ADMIN — MORPHINE SULFATE 2 MILLIGRAM(S): 50 CAPSULE, EXTENDED RELEASE ORAL at 19:40

## 2019-04-08 RX ADMIN — FENTANYL CITRATE 1 PATCH: 50 INJECTION INTRAVENOUS at 07:15

## 2019-04-08 RX ADMIN — MORPHINE SULFATE 2 MILLIGRAM(S): 50 CAPSULE, EXTENDED RELEASE ORAL at 05:22

## 2019-04-08 RX ADMIN — FENTANYL CITRATE 1 PATCH: 50 INJECTION INTRAVENOUS at 19:40

## 2019-04-08 RX ADMIN — MORPHINE SULFATE 2 MILLIGRAM(S): 50 CAPSULE, EXTENDED RELEASE ORAL at 21:42

## 2019-04-08 NOTE — PROGRESS NOTE ADULT - PROBLEM SELECTOR PLAN 2
was on Meropenem, will DC ABX  cultures negative  possible aspiration pneumonia  S&S follow up    free water PO if tolerates  DC IVF, IV steroids

## 2019-04-08 NOTE — PROGRESS NOTE ADULT - PROBLEM SELECTOR PLAN 6
amio to be stopped.  transition to CMO primary unknown   niece, Elly [phone], decided on 4/7 against feeding tube placement.  Will DC NGT and transition to comfort care only.  Hospice/CSM consult tomorrow for placement.    DNR/DNI  overall poor prognosis

## 2019-04-08 NOTE — PROGRESS NOTE ADULT - PROBLEM SELECTOR PLAN 7
primary unknown   niece, Elly [phone], decided on 4/7 against feeding tube placement.  Will DC NGT and transition to comfort care only.  Hospice/CSM consult tomorrow for placement.    DNR/DNI  overall poor prognosis Toxic /Metabolic Encephalopathy secondary to LEE/malignancy w severe protein calorie malnutrition, and DI     overall prognosis poor

## 2019-04-08 NOTE — PROGRESS NOTE ADULT - SUBJECTIVE AND OBJECTIVE BOX
Patient seen and examined earlier today.  Lying comfortably in bed.  Unable to communicate clearly.  NGT and sanchez in place.     Vital Signs Last 24 Hrs  Vital Signs Last 24 Hrs  T(C): 35.9 (08 Apr 2019 05:23), Max: 35.9 (08 Apr 2019 05:23)  T(F): 96.6 (08 Apr 2019 05:23), Max: 96.6 (08 Apr 2019 05:23)  HR: 92 (08 Apr 2019 05:23) (75 - 94)  BP: 92/54 (08 Apr 2019 05:23) (92/54 - 136/73)  BP(mean): --  RR: 16 (08 Apr 2019 05:23) (16 - 16)  SpO2: 93% (08 Apr 2019 04:00) (93% - 93%)      PHYSICAL EXAM:  GENERAL: NAD, mumbles words; dysarthria present  HEAD:  Atraumatic, Normocephalic  NERVOUS SYSTEM:  Awake, alert and responsive  CHEST/LUNG:  bilateral rhonchi  HEART: Regular rate and rhythm; No murmurs, rubs, or gallops  ABDOMEN: Soft, Nontender, Nondistended; Bowel sounds present, sanchez present  EXTREMITIES:  2+ Peripheral Pulses, No clubbing, cyanosis, or edema  LYMPH: No lymphadenopathy noted  SKIN: No rashes or lesions

## 2019-04-08 NOTE — CHART NOTE - NSCHARTNOTEFT_GEN_A_CORE
Nutrition follow-up note     Pt is CMO w/ plans on transitioning to Hospice care. No nutritional intervention at this time. Follow-up PRN.

## 2019-04-08 NOTE — PROGRESS NOTE ADULT - PROBLEM SELECTOR PLAN 8
Toxic /Metabolic Encephalopathy secondary to LEE/malignancy w severe protein calorie malnutrition, and DI     overall prognosis poor

## 2019-04-08 NOTE — PROGRESS NOTE ADULT - ASSESSMENT
78 y/o M from [facility], PMHx DMII, HTN, AAA repair, CKD3, hypothyroidism who p/w AMS &  dramatic weight loss over the past 3 months. In th ED pt was hypotensive, had CT angio w/ IV contrast done which showed no dissection but metastatic dx to bone w/ unclear primary. Blood work then showed LEE with Cr to 5.1, hyperkalemia to 6.9. Anderson placed and over 1 liter cloudy urine came out. Family was informed of concern for malignancy and pt's frailty, niece spoke with Dr. Young and decided to make him DNR/DNI. She came in 4/3 and made him comfort measures, no pressor support, but was ok with giving IV fluids and abx. Currently pt is off pressors, maintaining his own blood pressure. Niece asking for hospice consult, family refusing any further aggressive measures. Imaging does show metastatic disease to the bones, which suggests advanced cancer and this paired with his frailty makes him a good hospice candidate.  Poor prognosis.      Spoke with family again on 4/7/19 and niece and family made the decision to transition the patient to comfort care only, with plans to transition to Hospice care now.  They will talk with Hospice/Case mgmt tomorrow regarding this decision and future planning on placement options.     CMO care only  awaits placement possibly to SNF on hospice 80 y/o M from [facility], PMHx DMII, HTN, AAA repair, CKD3, hypothyroidism who p/w AMS &  dramatic weight loss over the past 3 months. In th ED pt was hypotensive, had CT angio w/ IV contrast done which showed no dissection but metastatic dx to bone w/ unclear primary. Blood work then showed LEE with Cr to 5.1, hyperkalemia to 6.9. Anderson placed and over 1 liter cloudy urine came out. Family was informed of concern for malignancy and pt's frailty, niece spoke with Dr. Young and decided to make him DNR/DNI. She came in 4/3 and made him comfort measures, no pressor support, but was ok with giving IV fluids and abx. Currently pt is off pressors, maintaining his own blood pressure. Niece asking for hospice consult, family refusing any further aggressive measures. Imaging does show metastatic disease to the bones, which suggests advanced cancer and this paired with his frailty makes him a good hospice candidate.  Poor prognosis.      Spoke with family again on 4/7/19 and niece and family made the decision to transition the patient to comfort care only, with plans to transition to Hospice care now.  They will talk with Hospice/Case mgmt tomorrow regarding this decision and future planning on placement options.     CMO care only  awaits placement possibly to SNF on hospice    TTE showed normal EF 55% with normal LVSF.  Do not suspect any CHF at this time.

## 2019-04-08 NOTE — PROGRESS NOTE ADULT - PROBLEM SELECTOR PLAN 1
Cr improving with good urine output  resolved metabolic acidosis and resolved hyperkalemia  bicarb and metformin dc'd  sepsis resolved now off pressors, WOULD TAPER STEROIDS  urine and blood cultures negative thus far  finish 2 more days of antibiotics as per ID  nephrology signed off

## 2019-04-08 NOTE — DISCHARGE NOTE PROVIDER - NSDCCPCAREPLAN_GEN_ALL_CORE_FT
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

## 2019-04-08 NOTE — DISCHARGE NOTE PROVIDER - HOSPITAL COURSE
78 y/o M from [facility], PMHx DMII, HTN, AAA repair, CKD3, hypothyroidism who p/w AMS &  dramatic weight loss over the past 3 months. In th ED pt was hypotensive, had CT angio w/ IV contrast done which showed no dissection but it did show new findings of widespread metastatic disease to bone w/ unclear primary. Blood work then showed LEE with Cr to 5.1, hyperkalemia to 6.9. Anderson placed and over 1 liter cloudy urine came out. Family was informed of concern for malignancy and pt's frailty, niece spoke with Dr. Young and they decided to make him DNR/DNI.  On 4/7 the family agreed to transition the patient to comfort measures only care. Currently pt is on comfort care with plans for disposition to SNF w hospice care.  Imaging showed metastatic disease to the bones, which suggests advanced cancer and this paired with his frailty makes him a good hospice candidate.  Poor prognosis.        Spoke with family on 4/7/19 (niece) and other family who made the decision to transition the patient to comfort care only, with plans to transition to Hospice care now.               Discharge Diagnoses:         Acute renal failure    metabolic acidosis    hyperkalemia     sepsis, resolved     aspiration pneumonia    Severe dysphagia         Diabetes mellitus, type 2     Hypothyroid        Atrial fibrillation     Malignancy, primary unknown    toxic/metabolic encephalopathy    Severe protein calorie malnutrition        Family contact:     Elly perla [phone]  Metabolic encephalopathy.  Plan: Toxic /Metabolic

## 2019-04-09 ENCOUNTER — OUTPATIENT (OUTPATIENT)
Dept: OUTPATIENT SERVICES | Facility: HOSPITAL | Age: 79
LOS: 1 days | End: 2019-04-09

## 2019-04-09 VITALS
TEMPERATURE: 97 F | RESPIRATION RATE: 16 BRPM | HEART RATE: 94 BPM | DIASTOLIC BLOOD PRESSURE: 70 MMHG | SYSTOLIC BLOOD PRESSURE: 145 MMHG

## 2019-04-09 DIAGNOSIS — Z98.890 OTHER SPECIFIED POSTPROCEDURAL STATES: Chronic | ICD-10-CM

## 2019-04-09 RX ORDER — ONDANSETRON 8 MG/1
4 TABLET, FILM COATED ORAL EVERY 8 HOURS
Qty: 0 | Refills: 0 | Status: DISCONTINUED | OUTPATIENT
Start: 2019-04-09 | End: 2019-04-09

## 2019-04-09 RX ORDER — HYOSCYAMINE SULFATE 0.13 MG
0.12 TABLET ORAL EVERY 8 HOURS
Qty: 0 | Refills: 0 | Status: DISCONTINUED | OUTPATIENT
Start: 2019-04-09 | End: 2019-04-09

## 2019-04-09 RX ORDER — MORPHINE SULFATE 50 MG/1
10 CAPSULE, EXTENDED RELEASE ORAL EVERY 4 HOURS
Qty: 0 | Refills: 0 | Status: DISCONTINUED | OUTPATIENT
Start: 2019-04-09 | End: 2019-04-09

## 2019-04-09 RX ORDER — MORPHINE SULFATE 50 MG/1
0.5 CAPSULE, EXTENDED RELEASE ORAL
Qty: 0 | Refills: 0 | COMMUNITY

## 2019-04-09 RX ORDER — HYOSCYAMINE SULFATE 0.13 MG
1 TABLET ORAL
Qty: 0 | Refills: 0 | COMMUNITY
Start: 2019-04-09

## 2019-04-09 RX ORDER — ONDANSETRON 8 MG/1
1 TABLET, FILM COATED ORAL
Qty: 0 | Refills: 0 | COMMUNITY
Start: 2019-04-09

## 2019-04-09 RX ORDER — ATROPINE SULFATE 1 %
2 DROPS OPHTHALMIC (EYE) EVERY 4 HOURS
Qty: 0 | Refills: 0 | Status: DISCONTINUED | OUTPATIENT
Start: 2019-04-09 | End: 2019-04-09

## 2019-04-09 RX ORDER — ATROPINE SULFATE 1 %
1 DROPS OPHTHALMIC (EYE)
Qty: 0 | Refills: 0 | COMMUNITY
Start: 2019-04-09

## 2019-04-09 RX ADMIN — MORPHINE SULFATE 10 MILLIGRAM(S): 50 CAPSULE, EXTENDED RELEASE ORAL at 08:27

## 2019-04-09 RX ADMIN — MORPHINE SULFATE 10 MILLIGRAM(S): 50 CAPSULE, EXTENDED RELEASE ORAL at 08:37

## 2019-04-09 RX ADMIN — FENTANYL CITRATE 1 PATCH: 50 INJECTION INTRAVENOUS at 07:34

## 2019-04-09 NOTE — PROGRESS NOTE ADULT - REASON FOR ADMISSION
altered mental status

## 2019-04-09 NOTE — DISCHARGE NOTE NURSING/CASE MANAGEMENT/SOCIAL WORK - NSDCDPATPORTLINK_GEN_ALL_CORE
You can access the [facility] Portal, offered by [facility], by registering with the following website: [email or web address]

## 2019-04-09 NOTE — PROGRESS NOTE ADULT - ASSESSMENT
80 y/o M from [facility], PMHx DMII, HTN, AAA repair, CKD3, hypothyroidism who p/w AMS &  dramatic weight loss over the past 3 months. In th ED pt was hypotensive, had CT angio w/ IV contrast done which showed no dissection but metastatic dx to bone w/ unclear primary. Blood work then showed LEE with Cr to 5.1, hyperkalemia to 6.9. Anderson placed and over 1 liter cloudy urine came out. Family was informed of concern for malignancy and pt's frailty, niece spoke with Dr. Young and decided to make him DNR/DNI. She came in 4/3 and made him comfort measures, no pressor support, but was ok with giving IV fluids and abx. Currently pt is off pressors, maintaining his own blood pressure. Niece asking for hospice consult, family refusing any further aggressive measures. Imaging does show metastatic disease to the bones, which suggests advanced cancer and this paired with his frailty makes him a good hospice candidate.  Poor prognosis.  TTE showed normal EF 55% with normal LVSF.  Do not suspect any CHF at this time.    Spoke with family again on 4/7/19 and niece and family made the decision to transition the patient to comfort care only.  Plan for DC to SNF on hospice care & comfort medications.

## 2019-04-09 NOTE — PROGRESS NOTE ADULT - SUBJECTIVE AND OBJECTIVE BOX
Patient seen and examined earlier today.  Lying comfortably in bed.  Unable to communicate clearly.        Vital Signs Last 24 Hrs  T(C): 35.9 (09 Apr 2019 06:31), Max: 36.7 (08 Apr 2019 14:00)  T(F): 96.7 (09 Apr 2019 06:31), Max: 98.1 (08 Apr 2019 14:00)  HR: 94 (09 Apr 2019 06:31) (68 - 97)  BP: 145/70 (09 Apr 2019 06:31) (115/60 - 145/70)  BP(mean): --  RR: 16 (09 Apr 2019 06:31) (16 - 16)  SpO2: --      PHYSICAL EXAM:  GENERAL: NAD, mumbles words; dysarthria present  HEAD:  Atraumatic, Normocephalic  NERVOUS SYSTEM:  Awake, alert and responsive  CHEST/LUNG:  bilateral rhonchi  HEART: Regular rate and rhythm; No murmurs, rubs, or gallops  ABDOMEN: Soft, Nontender, Nondistended; Bowel sounds present, sanchez present  EXTREMITIES:  2+ Peripheral Pulses, No clubbing, cyanosis, or edema  LYMPH: No lymphadenopathy noted  SKIN: No rashes or lesions

## 2019-04-09 NOTE — DISCHARGE NOTE NURSING/CASE MANAGEMENT/SOCIAL WORK - NSDCPEPT PROEDHF_GEN_ALL_CORE
Low salt diet/Call primary care provider for follow up after discharge/Report signs and symptoms to primary care provider/Activities as tolerated/Monitor weight daily

## 2019-04-09 NOTE — PROGRESS NOTE ADULT - SUBJECTIVE AND OBJECTIVE BOX
infectious diseases progress note:  LUKE HAM is a 79yMale patient    HYPOTENSION,UNSPECIFIED HYPOTENSION TYPE;ACUTE RENAL FAILURE,UNSPECIFIED    Metabolic encephalopathy  Malignancy  Urinary tract infection without hematuria, site unspecified  Atrial fibrillation  Slurred speech  Weight loss  CHF (congestive heart failure)  Hypothyroid  Diabetes mellitus, type 2  Hypotension, unspecified hypotension type  Acute renal failure, unspecified acute renal failure type      ROS:  not relevant     Allergies    No Known Allergies    Intolerances        ANTIBIOTICS/RELEVANT:  antimicrobials    immunologic:    OTHER:  atropine 1% Ophthalmic Solution for SubLingual Use 2 Drop(s) SubLingual every 8 hours PRN  fentaNYL   Patch  12 MICROgram(s)/Hr. 1 Patch Transdermal every 48 hours  morphine  - Injectable 2 milliGRAM(s) IV Push every 2 hours PRN  ondansetron Injectable 4 milliGRAM(s) IV Push every 8 hours PRN      Objective:  T(F): 96.7 (04-09-19 @ 06:31), Max: 98.1 (04-08-19 @ 14:00)  HR: 94 (04-09-19 @ 06:31) (68 - 97)  BP: 145/70 (04-09-19 @ 06:31) (115/60 - 145/70)  RR: 16 (04-09-19 @ 06:31) (16 - 16)  SpO2: --    PHYSICAL EXAM:  Constitutional: comfortable 	  Neck:no JVD, no lymphadenopathy, supple  Respiratory: CTA torie  Cardiovascular: S1S2 RRR  Gastrointestinal:soft, (+) BS, no HSM  Extremities:no phlebitis       LABS:                  MICROBIOLOGY:    Culture - Blood (collected 04-03-19 @ 10:54)  Source: .Blood None  Final Report (04-08-19 @ 22:01):    No growth at 5 days.    Culture - Urine (collected 04-02-19 @ 20:07)  Source: .Urine Catheterized  Final Report (04-04-19 @ 01:17):    No growth    Culture - Blood (collected 04-02-19 @ 19:25)  Source: .Blood None  Final Report (04-08-19 @ 03:01):    No growth at 5 days.    Culture - Blood (collected 04-02-19 @ 13:07)  Source: .Blood Blood-Peripheral  Final Report (04-07-19 @ 23:00):    No growth at 5 days.    Culture - Blood (collected 04-02-19 @ 13:07)  Source: .Blood Blood-Peripheral  Final Report (04-07-19 @ 23:00):    No growth at 5 days.        Culture Results:   No growth at 5 days. (04-03 @ 10:54)  Culture Results:   No growth (04-02 @ 20:07)  Culture Results:   No growth at 5 days. (04-02 @ 19:25)  Culture Results:   No growth at 5 days. (04-02 @ 13:07)  Culture Results:   No growth at 5 days. (04-02 @ 13:07)        RADIOLOGY & ADDITIONAL STUDIES:

## 2019-04-09 NOTE — PROGRESS NOTE ADULT - PROBLEM SELECTOR PLAN 6
primary unknown   niece, Elly [phone], decided on 4/7 against feeding tube placement.  Will DC NGT and transition to comfort care only.  Hospice/CSM consult tomorrow for placement.    DNR/DNI  overall poor prognosis

## 2019-04-09 NOTE — PROGRESS NOTE ADULT - PROBLEM SELECTOR PROBLEM 2
Hypotension, unspecified hypotension type

## 2019-04-09 NOTE — PROGRESS NOTE ADULT - PROVIDER SPECIALTY LIST ADULT
Cardiology
Cardiology
Critical Care
Critical Care
Hospitalist
Infectious Disease
Internal Medicine
Nephrology
Critical Care
Hospitalist
Hospitalist

## 2019-04-09 NOTE — PROGRESS NOTE ADULT - PROBLEM SELECTOR PLAN 3
use insulin as needed

## 2019-04-10 DIAGNOSIS — N18.6 END STAGE RENAL DISEASE: ICD-10-CM

## 2019-04-10 DIAGNOSIS — I48.91 UNSPECIFIED ATRIAL FIBRILLATION: ICD-10-CM

## 2019-04-10 DIAGNOSIS — I10 ESSENTIAL (PRIMARY) HYPERTENSION: ICD-10-CM

## 2019-04-10 DIAGNOSIS — E11.9 TYPE 2 DIABETES MELLITUS WITHOUT COMPLICATIONS: ICD-10-CM

## 2019-04-10 DIAGNOSIS — C80.1 MALIGNANT (PRIMARY) NEOPLASM, UNSPECIFIED: ICD-10-CM

## 2019-04-10 PROBLEM — E87.5 HYPERKALEMIA: Chronic | Status: ACTIVE | Noted: 2019-04-02

## 2019-04-10 PROBLEM — H91.90 UNSPECIFIED HEARING LOSS, UNSPECIFIED EAR: Chronic | Status: ACTIVE | Noted: 2019-04-02

## 2019-04-10 PROBLEM — M10.9 GOUT, UNSPECIFIED: Chronic | Status: ACTIVE | Noted: 2019-04-02

## 2019-04-10 PROBLEM — I50.9 HEART FAILURE, UNSPECIFIED: Chronic | Status: ACTIVE | Noted: 2019-04-02

## 2019-04-10 PROBLEM — E03.9 HYPOTHYROIDISM, UNSPECIFIED: Chronic | Status: ACTIVE | Noted: 2019-04-02

## 2019-04-10 PROBLEM — N40.0 BENIGN PROSTATIC HYPERPLASIA WITHOUT LOWER URINARY TRACT SYMPTOMS: Chronic | Status: ACTIVE | Noted: 2019-04-02

## 2019-04-10 PROBLEM — E78.00 PURE HYPERCHOLESTEROLEMIA, UNSPECIFIED: Chronic | Status: ACTIVE | Noted: 2019-04-02

## 2019-04-10 PROBLEM — I73.9 PERIPHERAL VASCULAR DISEASE, UNSPECIFIED: Chronic | Status: ACTIVE | Noted: 2019-04-02

## 2019-04-12 DIAGNOSIS — N17.9 ACUTE KIDNEY FAILURE, UNSPECIFIED: ICD-10-CM

## 2019-04-12 DIAGNOSIS — I95.9 HYPOTENSION, UNSPECIFIED: ICD-10-CM

## 2019-04-12 DIAGNOSIS — H91.90 UNSPECIFIED HEARING LOSS, UNSPECIFIED EAR: ICD-10-CM

## 2019-04-12 DIAGNOSIS — Z66 DO NOT RESUSCITATE: ICD-10-CM

## 2019-04-12 DIAGNOSIS — R13.10 DYSPHAGIA, UNSPECIFIED: ICD-10-CM

## 2019-04-12 DIAGNOSIS — E11.51 TYPE 2 DIABETES MELLITUS WITH DIABETIC PERIPHERAL ANGIOPATHY WITHOUT GANGRENE: ICD-10-CM

## 2019-04-12 DIAGNOSIS — N18.3 CHRONIC KIDNEY DISEASE, STAGE 3 (MODERATE): ICD-10-CM

## 2019-04-12 DIAGNOSIS — C79.51 SECONDARY MALIGNANT NEOPLASM OF BONE: ICD-10-CM

## 2019-04-12 DIAGNOSIS — E87.4 MIXED DISORDER OF ACID-BASE BALANCE: ICD-10-CM

## 2019-04-12 DIAGNOSIS — E03.9 HYPOTHYROIDISM, UNSPECIFIED: ICD-10-CM

## 2019-04-12 DIAGNOSIS — E87.0 HYPEROSMOLALITY AND HYPERNATREMIA: ICD-10-CM

## 2019-04-12 DIAGNOSIS — R33.8 OTHER RETENTION OF URINE: ICD-10-CM

## 2019-04-12 DIAGNOSIS — E43 UNSPECIFIED SEVERE PROTEIN-CALORIE MALNUTRITION: ICD-10-CM

## 2019-04-12 DIAGNOSIS — G93.41 METABOLIC ENCEPHALOPATHY: ICD-10-CM

## 2019-04-12 DIAGNOSIS — C80.1 MALIGNANT (PRIMARY) NEOPLASM, UNSPECIFIED: ICD-10-CM

## 2019-04-12 DIAGNOSIS — R65.21 SEVERE SEPSIS WITH SEPTIC SHOCK: ICD-10-CM

## 2019-04-12 DIAGNOSIS — N40.1 BENIGN PROSTATIC HYPERPLASIA WITH LOWER URINARY TRACT SYMPTOMS: ICD-10-CM

## 2019-04-12 DIAGNOSIS — A41.9 SEPSIS, UNSPECIFIED ORGANISM: ICD-10-CM

## 2019-04-12 DIAGNOSIS — R41.82 ALTERED MENTAL STATUS, UNSPECIFIED: ICD-10-CM

## 2019-04-12 DIAGNOSIS — M10.9 GOUT, UNSPECIFIED: ICD-10-CM

## 2019-04-12 DIAGNOSIS — E11.22 TYPE 2 DIABETES MELLITUS WITH DIABETIC CHRONIC KIDNEY DISEASE: ICD-10-CM

## 2019-04-12 DIAGNOSIS — E78.00 PURE HYPERCHOLESTEROLEMIA, UNSPECIFIED: ICD-10-CM

## 2019-04-12 DIAGNOSIS — Z79.84 LONG TERM (CURRENT) USE OF ORAL HYPOGLYCEMIC DRUGS: ICD-10-CM

## 2019-04-12 DIAGNOSIS — Z79.82 LONG TERM (CURRENT) USE OF ASPIRIN: ICD-10-CM

## 2019-04-12 DIAGNOSIS — J69.0 PNEUMONITIS DUE TO INHALATION OF FOOD AND VOMIT: ICD-10-CM

## 2019-04-12 DIAGNOSIS — Z51.5 ENCOUNTER FOR PALLIATIVE CARE: ICD-10-CM

## 2019-04-12 DIAGNOSIS — E87.5 HYPERKALEMIA: ICD-10-CM

## 2019-04-12 DIAGNOSIS — I50.32 CHRONIC DIASTOLIC (CONGESTIVE) HEART FAILURE: ICD-10-CM

## 2019-04-12 DIAGNOSIS — N39.0 URINARY TRACT INFECTION, SITE NOT SPECIFIED: ICD-10-CM

## 2019-04-19 NOTE — ED PROCEDURE NOTE - CPROC ED POST RADIOGRAPHY1
central line located in the superior vena cava/post-procedure radiography performed soft/bowel sounds normal/nontender/no distention/no masses palpable

## 2021-01-09 NOTE — PROGRESS NOTE ADULT - PROBLEM/PLAN-3
normal...
DISPLAY PLAN FREE TEXT
Well appearing, awake, alert, oriented to person, place, time/situation and in no apparent distress.
DISPLAY PLAN FREE TEXT

## 2021-08-16 NOTE — ED PROVIDER NOTE - PRINCIPAL DIAGNOSIS
Detail Level: Detailed
Quality 110: Preventive Care And Screening: Influenza Immunization: Influenza Immunization not Administered because Patient Refused.
Hypotension, unspecified hypotension type

## 2022-04-23 NOTE — PROGRESS NOTE ADULT - PROBLEM SELECTOR PROBLEM 1
Acute renal failure, unspecified acute renal failure type
Hypotension, unspecified hypotension type
Urinary tract infection without hematuria, site unspecified
Urinary tract infection without hematuria, site unspecified
Acute renal failure, unspecified acute renal failure type
Breath sounds clear and equal bilaterally.

## 2022-09-07 NOTE — DISCHARGE NOTE PROVIDER - NSDCHC_MEDRECSTATUS_GEN_ALL_CORE
Patient has been advised and will go to the [facility] urgent care Admission Reconciliation is Completed  Discharge Reconciliation is Not Complete Admission Reconciliation is Completed  Discharge Reconciliation is Completed
